# Patient Record
Sex: FEMALE | Race: WHITE | NOT HISPANIC OR LATINO | Employment: FULL TIME | URBAN - METROPOLITAN AREA
[De-identification: names, ages, dates, MRNs, and addresses within clinical notes are randomized per-mention and may not be internally consistent; named-entity substitution may affect disease eponyms.]

---

## 2023-06-04 PROBLEM — M54.16 LUMBAR RADICULITIS: Status: ACTIVE | Noted: 2023-06-04

## 2023-06-04 PROBLEM — L98.8 FACIAL RHYTIDS: Status: ACTIVE | Noted: 2023-06-04

## 2023-06-04 PROBLEM — M48.062 SPINAL STENOSIS OF LUMBAR REGION WITH NEUROGENIC CLAUDICATION: Status: ACTIVE | Noted: 2023-06-04

## 2023-06-04 PROBLEM — E04.1 THYROID NODULE: Status: ACTIVE | Noted: 2023-06-04

## 2023-06-04 PROBLEM — M25.569 ARTHRALGIA OF KNEE: Status: ACTIVE | Noted: 2023-06-04

## 2023-06-04 PROBLEM — M51.26 LUMBAR DISCOGENIC PAIN SYNDROME: Status: ACTIVE | Noted: 2023-06-04

## 2023-06-04 PROBLEM — H93.19 TINNITUS: Status: ACTIVE | Noted: 2023-06-04

## 2023-06-04 PROBLEM — F33.0 MILD EPISODE OF RECURRENT MAJOR DEPRESSIVE DISORDER (CMS-HCC): Status: ACTIVE | Noted: 2023-06-04

## 2023-06-04 PROBLEM — Z67.20 TYPE B BLOOD, RH POSITIVE: Status: ACTIVE | Noted: 2023-06-04

## 2023-06-04 PROBLEM — M25.50 ARTHRALGIA: Status: ACTIVE | Noted: 2023-06-04

## 2023-06-04 PROBLEM — F51.01 PRIMARY INSOMNIA: Status: ACTIVE | Noted: 2023-06-04

## 2023-06-04 PROBLEM — E78.00 PURE HYPERCHOLESTEROLEMIA: Status: ACTIVE | Noted: 2023-06-04

## 2023-06-04 PROBLEM — M47.816 LUMBAR SPONDYLOSIS: Status: ACTIVE | Noted: 2023-06-04

## 2023-06-04 PROBLEM — K64.5 EXTERNAL HEMORRHOID, THROMBOSED: Status: ACTIVE | Noted: 2023-06-04

## 2023-06-04 PROBLEM — D64.89 OTHER SPECIFIED ANEMIAS: Status: ACTIVE | Noted: 2023-06-04

## 2023-06-04 PROBLEM — K80.20 GALLSTONES: Status: ACTIVE | Noted: 2023-06-04

## 2023-06-04 PROBLEM — E55.9 VITAMIN D DEFICIENCY: Status: ACTIVE | Noted: 2023-06-04

## 2023-06-04 PROBLEM — F41.9 ANXIETY: Status: ACTIVE | Noted: 2023-06-04

## 2023-08-01 DIAGNOSIS — M25.512 PAIN IN LEFT SHOULDER: ICD-10-CM

## 2023-08-01 RX ORDER — MELOXICAM 15 MG/1
15 TABLET ORAL
Qty: 60 TABLET | Refills: 0 | Status: SHIPPED | OUTPATIENT
Start: 2023-08-01 | End: 2023-09-01 | Stop reason: SDUPTHER

## 2023-08-02 LAB
ALANINE AMINOTRANSFERASE (SGPT) (U/L) IN SER/PLAS: 17 U/L (ref 7–45)
ALBUMIN (G/DL) IN SER/PLAS: 4.4 G/DL (ref 3.4–5)
ALKALINE PHOSPHATASE (U/L) IN SER/PLAS: 80 U/L (ref 33–110)
ANION GAP IN SER/PLAS: 12 MMOL/L (ref 10–20)
ASPARTATE AMINOTRANSFERASE (SGOT) (U/L) IN SER/PLAS: 26 U/L (ref 9–39)
BASOPHILS (10*3/UL) IN BLOOD BY AUTOMATED COUNT: 0.09 X10E9/L (ref 0–0.1)
BASOPHILS/100 LEUKOCYTES IN BLOOD BY AUTOMATED COUNT: 1 % (ref 0–2)
BILIRUBIN TOTAL (MG/DL) IN SER/PLAS: 0.5 MG/DL (ref 0–1.2)
CALCIUM (MG/DL) IN SER/PLAS: 9.4 MG/DL (ref 8.6–10.3)
CARBON DIOXIDE, TOTAL (MMOL/L) IN SER/PLAS: 29 MMOL/L (ref 21–32)
CHLORIDE (MMOL/L) IN SER/PLAS: 102 MMOL/L (ref 98–107)
CHORIOGONADOTROPIN (MIU/ML) IN SER/PLAS: <2 MIU/ML
CREATININE (MG/DL) IN SER/PLAS: 1 MG/DL (ref 0.5–1.05)
EOSINOPHILS (10*3/UL) IN BLOOD BY AUTOMATED COUNT: 0.3 X10E9/L (ref 0–0.7)
EOSINOPHILS/100 LEUKOCYTES IN BLOOD BY AUTOMATED COUNT: 3.4 % (ref 0–6)
ERYTHROCYTE DISTRIBUTION WIDTH (RATIO) BY AUTOMATED COUNT: 11.9 % (ref 11.5–14.5)
ERYTHROCYTE MEAN CORPUSCULAR HEMOGLOBIN CONCENTRATION (G/DL) BY AUTOMATED: 32.8 G/DL (ref 32–36)
ERYTHROCYTE MEAN CORPUSCULAR VOLUME (FL) BY AUTOMATED COUNT: 98 FL (ref 80–100)
ERYTHROCYTES (10*6/UL) IN BLOOD BY AUTOMATED COUNT: 4.31 X10E12/L (ref 4–5.2)
GFR FEMALE: 69 ML/MIN/1.73M2
GLUCOSE (MG/DL) IN SER/PLAS: 81 MG/DL (ref 74–99)
HEMATOCRIT (%) IN BLOOD BY AUTOMATED COUNT: 42.4 % (ref 36–46)
HEMOGLOBIN (G/DL) IN BLOOD: 13.9 G/DL (ref 12–16)
IMMATURE GRANULOCYTES/100 LEUKOCYTES IN BLOOD BY AUTOMATED COUNT: 0.3 % (ref 0–0.9)
LEUKOCYTES (10*3/UL) IN BLOOD BY AUTOMATED COUNT: 8.8 X10E9/L (ref 4.4–11.3)
LYMPHOCYTES (10*3/UL) IN BLOOD BY AUTOMATED COUNT: 2.78 X10E9/L (ref 1.2–4.8)
LYMPHOCYTES/100 LEUKOCYTES IN BLOOD BY AUTOMATED COUNT: 31.5 % (ref 13–44)
MONOCYTES (10*3/UL) IN BLOOD BY AUTOMATED COUNT: 0.71 X10E9/L (ref 0.1–1)
MONOCYTES/100 LEUKOCYTES IN BLOOD BY AUTOMATED COUNT: 8 % (ref 2–10)
NEUTROPHILS (10*3/UL) IN BLOOD BY AUTOMATED COUNT: 4.91 X10E9/L (ref 1.2–7.7)
NEUTROPHILS/100 LEUKOCYTES IN BLOOD BY AUTOMATED COUNT: 55.8 % (ref 40–80)
PLATELETS (10*3/UL) IN BLOOD AUTOMATED COUNT: 296 X10E9/L (ref 150–450)
POTASSIUM (MMOL/L) IN SER/PLAS: 4.1 MMOL/L (ref 3.5–5.3)
PROTEIN TOTAL: 6.8 G/DL (ref 6.4–8.2)
SODIUM (MMOL/L) IN SER/PLAS: 139 MMOL/L (ref 136–145)
UREA NITROGEN (MG/DL) IN SER/PLAS: 19 MG/DL (ref 6–23)

## 2023-08-08 ENCOUNTER — TELEPHONE (OUTPATIENT)
Dept: PRIMARY CARE | Facility: CLINIC | Age: 48
End: 2023-08-08
Payer: COMMERCIAL

## 2023-08-08 DIAGNOSIS — R39.9 UTI SYMPTOMS: Primary | ICD-10-CM

## 2023-08-08 RX ORDER — SULFAMETHOXAZOLE AND TRIMETHOPRIM 800; 160 MG/1; MG/1
1 TABLET ORAL 2 TIMES DAILY
Qty: 14 TABLET | Refills: 0 | Status: SHIPPED | OUTPATIENT
Start: 2023-08-08 | End: 2023-08-15

## 2023-08-08 NOTE — TELEPHONE ENCOUNTER
email to me:  Hello there.  I am so sorry to be using this avenue, but I am a little desperate here.  I will be honest first by saying that I have not tried to call for an appointment yet.  I am 100% certain I have a UTI.  Most times in the past I have been able to get rid of them with just drinking lots of fluids and the azo pills.  I am now on day 3 and it's not getting better.  I would try and come in but I work tomorrow and I am having shoulder surgery on Thursday and really wanted to try and get this taken care of before hand.  Would it be too much to ask to help?    Plan: bactrim sent in.  Pt should inform surgeon of her uti.  Fluids  Azo  Call if sx worsen or change, especially fever, chills, worsening back or abdominal pain.

## 2023-08-10 ENCOUNTER — HOSPITAL ENCOUNTER (OUTPATIENT)
Dept: DATA CONVERSION | Facility: HOSPITAL | Age: 48
End: 2023-08-10
Attending: ORTHOPAEDIC SURGERY | Admitting: ORTHOPAEDIC SURGERY
Payer: COMMERCIAL

## 2023-08-10 DIAGNOSIS — M75.21 BICIPITAL TENDINITIS, RIGHT SHOULDER: ICD-10-CM

## 2023-08-10 DIAGNOSIS — M75.41 IMPINGEMENT SYNDROME OF RIGHT SHOULDER: ICD-10-CM

## 2023-08-10 DIAGNOSIS — S43.431A SUPERIOR GLENOID LABRUM LESION OF RIGHT SHOULDER, INITIAL ENCOUNTER: ICD-10-CM

## 2023-08-10 DIAGNOSIS — S46.011D STRAIN OF MUSCLE(S) AND TENDON(S) OF THE ROTATOR CUFF OF RIGHT SHOULDER, SUBSEQUENT ENCOUNTER: ICD-10-CM

## 2023-08-10 DIAGNOSIS — M75.101 UNSPECIFIED ROTATOR CUFF TEAR OR RUPTURE OF RIGHT SHOULDER, NOT SPECIFIED AS TRAUMATIC: ICD-10-CM

## 2023-08-31 ASSESSMENT — ENCOUNTER SYMPTOMS
CONSTITUTIONAL NEGATIVE: 1
SHORTNESS OF BREATH: 0
WHEEZING: 0

## 2023-08-31 NOTE — PROGRESS NOTES
Subjective   Patient ID: Ashtyn Yoo is a 47 y.o. female who presents for No chief complaint on file..  Last physical: 12/8/22  Last mammo 6/2022; does pt have an order from her ob gyn for next mammo? No  Last pap 4/25/18 cally Oden; due  Last colon 11/2021; due 11/2028  Does pt want flu shot? Yes  Did pt see rheumatologist? No    Is pt fasting? No  Phq9=5 , gad7=3    HPI  Spine surgery 1/2023  Rotator cuff surgery recently; in rehab    Refill meloxicam    Last labs-8/2023 cbc nl, cmp nl; 12/2022 tsh nl, ldl 146  Due for labs- lipid    Exercise-walking or riding bike and lower body exercises  Breakfast-fast or if up at 4a good breakfast; water, monster 1 a day  Lunch-meal delivery and fits in macros, water  Dinner-meal delivery at times, water  Snacks-protein shakes, fruit  Etoh-socially  Takes fiber supplement-helps with hemorrhoid issues    Was on wellbutrin 450mg a day and hydroxyzine prn  Weaned off wellbutrin but restarted it at 300mg and wants a refill of the 300mg    Cholesterol   Date Value Ref Range Status   12/07/2022 215 (H) 0 - 199 mg/dL Final     Comment:     .      AGE      DESIRABLE   BORDERLINE HIGH   HIGH     0-19 Y     0 - 169       170 - 199     >/= 200    20-24 Y     0 - 189       190 - 224     >/= 225         >24 Y     0 - 199       200 - 239     >/= 240   **All ranges are based on fasting samples. Specific   therapeutic targets will vary based on patient-specific   cardiac risk.  .   Pediatric guidelines reference:Pediatrics 2011, 128(S5).   Adult guidelines reference: NCEP ATPIII Guidelines,     HELLEN 2001, 258:2486-97  .   Venipuncture immediately after or during the    administration of Metamizole may lead to falsely   low results. Testing should be performed immediately   prior to Metamizole dosing.     10/05/2021 163 0 - 199 mg/dL Final     Comment:     .      AGE      DESIRABLE   BORDERLINE HIGH   HIGH     0-19 Y     0 - 169       170 - 199     >/= 200    20-24 Y     0 - 189       190 -  224     >/= 225         >24 Y     0 - 199       200 - 239     >/= 240   **All ranges are based on fasting samples. Specific   therapeutic targets will vary based on patient-specific   cardiac risk.  .   Pediatric guidelines reference:Pediatrics 2011, 128(S5).   Adult guidelines reference: NCEP ATPIII Guidelines,     HELLEN 2001, 258:2486-97  .   Venipuncture immediately after or during the    administration of Metamizole may lead to falsely   low results. Testing should be performed immediately   prior to Metamizole dosing.     09/27/2019 213 (H) 0 - 199 mg/dL Final     Comment:     .      AGE      DESIRABLE   BORDERLINE HIGH   HIGH     0-19 Y     0 - 169       170 - 199     >/= 200    20-24 Y     0 - 189       190 - 224     >/= 225         >24 Y     0 - 199       200 - 239     >/= 240   **All ranges are based on fasting samples. Specific   therapeutic targets will vary based on patient-specific   cardiac risk.  .   Pediatric guidelines reference:Pediatrics 2011, 128(S5).   Adult guidelines reference: NCEP ATPIII Guidelines,     HELLEN 2001, 258:2486-97  .   Venipuncture immediately after or during the    administration of Metamizole may lead to falsely   low results. Testing should be performed immediately   prior to Metamizole dosing.       Triglycerides   Date Value Ref Range Status   12/07/2022 122 0 - 149 mg/dL Final     Comment:     .      AGE      DESIRABLE   BORDERLINE HIGH   HIGH     VERY HIGH   0 D-90 D    19 - 174         ----         ----        ----  91 D- 9 Y     0 -  74        75 -  99     >/= 100      ----    10-19 Y     0 -  89        90 - 129     >/= 130      ----    20-24 Y     0 - 114       115 - 149     >/= 150      ----         >24 Y     0 - 149       150 - 199    200- 499    >/= 500  .   Venipuncture immediately after or during the    administration of Metamizole may lead to falsely   low results. Testing should be performed immediately   prior to Metamizole dosing.     10/05/2021 63 0 - 149 mg/dL  Final     Comment:     .      AGE      DESIRABLE   BORDERLINE HIGH   HIGH     VERY HIGH   0 D-90 D    19 - 174         ----         ----        ----  91 D- 9 Y     0 -  74        75 -  99     >/= 100      ----    10-19 Y     0 -  89        90 - 129     >/= 130      ----    20-24 Y     0 - 114       115 - 149     >/= 150      ----         >24 Y     0 - 149       150 - 199    200- 499    >/= 500  .   Venipuncture immediately after or during the    administration of Metamizole may lead to falsely   low results. Testing should be performed immediately   prior to Metamizole dosing.     09/27/2019 81 0 - 149 mg/dL Final     Comment:     .      AGE      DESIRABLE   BORDERLINE HIGH   HIGH     VERY HIGH   0 D-90 D    19 - 174         ----         ----        ----  91 D- 9 Y     0 -  74        75 -  99     >/= 100      ----    10-19 Y     0 -  89        90 - 129     >/= 130      ----    20-24 Y     0 - 114       115 - 149     >/= 150      ----         >24 Y     0 - 149       150 - 199    200- 499    >/= 500  .   Venipuncture immediately after or during the    administration of Metamizole may lead to falsely   low results. Testing should be performed immediately   prior to Metamizole dosing.       HDL   Date Value Ref Range Status   12/07/2022 44.2 mg/dL Final     Comment:     .      AGE      VERY LOW   LOW     NORMAL    HIGH       0-19 Y       < 35   < 40     40-45     ----    20-24 Y       ----   < 40       >45     ----      >24 Y       ----   < 40     40-60      >60  .     10/05/2021 72.0 mg/dL Final     Comment:     .      AGE      VERY LOW   LOW     NORMAL    HIGH       0-19 Y       < 35   < 40     40-45     ----    20-24 Y       ----   < 40       >45     ----      >24 Y       ----   < 40     40-60      >60  .     09/27/2019 94.1 mg/dL Final     Comment:     .      AGE      VERY LOW   LOW     NORMAL    HIGH       0-19 Y       < 35   < 40     40-45     ----    20-24 Y       ----   < 40       >45     ----      >24 Y        "----   < 40     40-60      >60  .       No results found for: \"LDL\"  TSH   Date Value Ref Range Status   12/07/2022 1.17 0.44 - 3.98 mIU/L Final     Comment:      TSH testing is performed using different testing    methodology at [facility] than at other    [facility] hospitals. Direct result comparisons should    only be made within the same method.       No results found for: \"A1C\"  No components found for: \"POCA1C\"  No results found for: \"ALBUR\"  No components found for: \"POCALBUR\"        Immunization History   Administered Date(s) Administered    Moderna SARS-CoV-2 Vaccination 12/30/2020, 01/26/2021, 11/15/2021    Tdap vaccine, age 10 years and older (BOOSTRIX) 05/27/2022        No care team member to display     Review of Systems   Constitutional: Negative.    Respiratory:  Negative for shortness of breath and wheezing.    Cardiovascular:  Negative for chest pain.     Objective   There were no vitals taken for this visit.   BP Readings from Last 3 Encounters:   12/08/22 121/76   11/03/22 (!) 149/95   06/09/22 118/78     Wt Readings from Last 3 Encounters:   12/27/22 70.3 kg (155 lb)   12/08/22 70.8 kg (156 lb)   11/14/22 68 kg (150 lb)       The 10-year ASCVD risk score (Ruslan KIM, et al., 2019) is: 1.4%    Values used to calculate the score:      Age: 47 years      Sex: Female      Is Non- : No      Diabetic: No      Tobacco smoker: No      Systolic Blood Pressure: 121 mmHg      Is BP treated: No      HDL Cholesterol: 44.2 mg/dL      Total Cholesterol: 215 mg/dL     Physical Exam  Constitutional:       General: She is not in acute distress.     Appearance: Normal appearance.   Neurological:      Mental Status: She is alert.     Assessment/Plan   Diagnoses and all orders for this visit:  Pure hypercholesterolemia  -     Lipid Panel; Future  Anxiety  -     buPROPion XL (Wellbutrin XL) 300 mg 24 hr tablet; Take 1 tablet (300 mg) by mouth once daily. Do not crush, chew, or " split.  Mild episode of recurrent major depressive disorder (CMS/HCC)  -     buPROPion XL (Wellbutrin XL) 300 mg 24 hr tablet; Take 1 tablet (300 mg) by mouth once daily. Do not crush, chew, or split.  Encounter for screening mammogram for malignant neoplasm of breast  -     BI mammo bilateral screening tomosynthesis; Future  Pain in left shoulder  -     meloxicam (Mobic) 15 mg tablet; Take 1 tablet (15 mg) by mouth once daily. Take with food.  Arthralgia, unspecified joint  -     Referral to Rheumatology; Future  Other orders  -     Flu vaccine (IIV4) age 6 months and greater, preservative free  -     Follow Up In Primary Care - Health Maintenance; Future

## 2023-09-01 ENCOUNTER — OFFICE VISIT (OUTPATIENT)
Dept: PRIMARY CARE | Facility: CLINIC | Age: 48
End: 2023-09-01
Payer: COMMERCIAL

## 2023-09-01 VITALS
SYSTOLIC BLOOD PRESSURE: 129 MMHG | BODY MASS INDEX: 26.12 KG/M2 | WEIGHT: 153 LBS | DIASTOLIC BLOOD PRESSURE: 85 MMHG | TEMPERATURE: 97.7 F | HEIGHT: 64 IN | HEART RATE: 78 BPM

## 2023-09-01 DIAGNOSIS — E78.00 PURE HYPERCHOLESTEROLEMIA: Primary | ICD-10-CM

## 2023-09-01 DIAGNOSIS — F41.9 ANXIETY: ICD-10-CM

## 2023-09-01 DIAGNOSIS — Z12.31 ENCOUNTER FOR SCREENING MAMMOGRAM FOR MALIGNANT NEOPLASM OF BREAST: ICD-10-CM

## 2023-09-01 DIAGNOSIS — M25.512 PAIN IN LEFT SHOULDER: ICD-10-CM

## 2023-09-01 DIAGNOSIS — F33.0 MILD EPISODE OF RECURRENT MAJOR DEPRESSIVE DISORDER (CMS-HCC): ICD-10-CM

## 2023-09-01 DIAGNOSIS — M25.50 ARTHRALGIA, UNSPECIFIED JOINT: ICD-10-CM

## 2023-09-01 PROCEDURE — 99214 OFFICE O/P EST MOD 30 MIN: CPT | Performed by: NURSE PRACTITIONER

## 2023-09-01 PROCEDURE — 90686 IIV4 VACC NO PRSV 0.5 ML IM: CPT | Performed by: NURSE PRACTITIONER

## 2023-09-01 PROCEDURE — 1036F TOBACCO NON-USER: CPT | Performed by: NURSE PRACTITIONER

## 2023-09-01 PROCEDURE — 90471 IMMUNIZATION ADMIN: CPT | Performed by: NURSE PRACTITIONER

## 2023-09-01 RX ORDER — BUPROPION HYDROCHLORIDE 300 MG/1
300 TABLET ORAL DAILY
Qty: 90 TABLET | Refills: 4 | Status: SHIPPED | OUTPATIENT
Start: 2023-09-01 | End: 2024-05-14 | Stop reason: SDUPTHER

## 2023-09-01 RX ORDER — MELOXICAM 15 MG/1
15 TABLET ORAL DAILY
Qty: 30 TABLET | Refills: 5 | Status: SHIPPED | OUTPATIENT
Start: 2023-09-01 | End: 2023-12-26

## 2023-09-01 ASSESSMENT — PATIENT HEALTH QUESTIONNAIRE - PHQ9
10. IF YOU CHECKED OFF ANY PROBLEMS, HOW DIFFICULT HAVE THESE PROBLEMS MADE IT FOR YOU TO DO YOUR WORK, TAKE CARE OF THINGS AT HOME, OR GET ALONG WITH OTHER PEOPLE: NOT DIFFICULT AT ALL
1. LITTLE INTEREST OR PLEASURE IN DOING THINGS: NOT AT ALL
7. TROUBLE CONCENTRATING ON THINGS, SUCH AS READING THE NEWSPAPER OR WATCHING TELEVISION: NOT AT ALL
6. FEELING BAD ABOUT YOURSELF - OR THAT YOU ARE A FAILURE OR HAVE LET YOURSELF OR YOUR FAMILY DOWN: SEVERAL DAYS
8. MOVING OR SPEAKING SO SLOWLY THAT OTHER PEOPLE COULD HAVE NOTICED. OR THE OPPOSITE, BEING SO FIGETY OR RESTLESS THAT YOU HAVE BEEN MOVING AROUND A LOT MORE THAN USUAL: NOT AT ALL
SUM OF ALL RESPONSES TO PHQ9 QUESTIONS 1 AND 2: 1
SUM OF ALL RESPONSES TO PHQ QUESTIONS 1-9: 5
2. FEELING DOWN, DEPRESSED OR HOPELESS: SEVERAL DAYS
9. THOUGHTS THAT YOU WOULD BE BETTER OFF DEAD, OR OF HURTING YOURSELF: NOT AT ALL
5. POOR APPETITE OR OVEREATING: NOT AT ALL
4. FEELING TIRED OR HAVING LITTLE ENERGY: NOT AT ALL
3. TROUBLE FALLING OR STAYING ASLEEP OR SLEEPING TOO MUCH: NEARLY EVERY DAY

## 2023-09-01 ASSESSMENT — ANXIETY QUESTIONNAIRES
GAD7 TOTAL SCORE: 3
IF YOU CHECKED OFF ANY PROBLEMS ON THIS QUESTIONNAIRE, HOW DIFFICULT HAVE THESE PROBLEMS MADE IT FOR YOU TO DO YOUR WORK, TAKE CARE OF THINGS AT HOME, OR GET ALONG WITH OTHER PEOPLE: NOT DIFFICULT AT ALL
7. FEELING AFRAID AS IF SOMETHING AWFUL MIGHT HAPPEN: NOT AT ALL
6. BECOMING EASILY ANNOYED OR IRRITABLE: NOT AT ALL
2. NOT BEING ABLE TO STOP OR CONTROL WORRYING: SEVERAL DAYS
5. BEING SO RESTLESS THAT IT IS HARD TO SIT STILL: NOT AT ALL
1. FEELING NERVOUS, ANXIOUS, OR ON EDGE: SEVERAL DAYS
4. TROUBLE RELAXING: NOT AT ALL
3. WORRYING TOO MUCH ABOUT DIFFERENT THINGS: SEVERAL DAYS

## 2023-09-01 NOTE — PATIENT INSTRUCTIONS
Refill meloxicam    Continue wellbutrin 300mg 1 a day-refill done    See rheum   Set up mammogram    Flu shot today    You can use the lab in our building when fasting. The hrs are: Monday-Friday, 7 a.m. - 5 p.m., Saturday 8 a.m. - 12 noon.   No appt needed, BUT YOU DO NEED THE PAPER ORDER.    Fasting is no food, drink, gum or mints other than water for 8-12 hrs.   Results will be back in 2-3 business days for most labs. It is always recommended for any orders (labs, xrays, ultrasounds,MRI, ct scan, procedures etc) to check with your insurance provider for expected costs or expenses to you.     Goal LDL <100 (146)  Goal trigs <150  Goal HDL >50  Exercise-cardio 4-5d/wk 30min each day  Diet-Breakfast-toast (my favorite Lorna Doss Delightful multi-grain and Timothy's Killer Bread thin-sliced Good Seed)/bagel/English muffin-whole wheat flour as a 1st ingredient or cereal/oatmeal/granola bar-fiber 4g or more; protein like eggs or peanut butter is Ok  Lunch-protein, veg 1c  Dinner-protein, veg 1c; if having potatoes, rice, noodles, or pasta-->fist sized; could try brown rice or whole wheat pasta or quinoa  Fruit 2 a day  Dairy 2 a day-milk, almond milk, soy milk, yogurt, cottage cheese, yogurt  Snacks-Protein-hard boiled egg, nuts (walnuts/almonds/pecans/pistachios 1/4c), hummus, beef/deer jerky; vegetable, fruit, dairy-milk(1%, skim, almond, soy)/cheese (not a lot of cheddar)/yogurt (Greek is best-my favorite Dannon Fruit on the Bottom Greek)/cottage cheese 2%; triscuits, popcorn have a lot of fiber; serving size  Water  Limit alcohol to 2 drinks per day (1 drink=12oz beer or 5oz wine or 1 1/2oz liquor)  appt in  6  months; be fasting      I will communicate with you via [employer] regarding messages and results. If you need help with this, you can call the support line at [phone].    IT WAS A PLEASURE TO SEE YOU TODAY. THANK YOU FOR CHOOSING US FOR YOUR HEALTHCARE NEEDS.

## 2023-09-11 ENCOUNTER — TELEPHONE (OUTPATIENT)
Dept: PRIMARY CARE | Facility: CLINIC | Age: 48
End: 2023-09-11

## 2023-09-11 NOTE — TELEPHONE ENCOUNTER
Pls inform pt:  I received the last pap result from dr leigh.   It was 4/25/18  Pt is due for next pap.

## 2023-09-29 VITALS — WEIGHT: 147.05 LBS | HEIGHT: 60 IN | BODY MASS INDEX: 28.87 KG/M2

## 2023-09-30 NOTE — H&P
History & Physical Reviewed:   Pregnant/Lactating:  ·  Are You Pregnant no   ·  Are You Currently Breastfeeding no     I have reviewed the History and Physical dated:  21-Jul-2023   History and Physical reviewed and relevant findings noted. Patient examined to review pertinent physical  findings.: No significant changes   Home Medications Reviewed: no changes noted   Allergies Reviewed: no changes noted       ERAS (Enhanced Recovery After Surgery):  ·  ERAS Patient: no     Consent:   COVID-19 Consent:  ·  COVID-19 Risk Consent Surgeon has reviewed key risks related to the risk of shira COVID-19 and if they contract COVID-19 what the risks are.     Attestation:   Note Completion:  I am a:  Resident/Fellow   Attending Attestation I saw and evaluated the patient.  I personally obtained the key and critical portions of the history and physical exam or was physically present for key and  critical portions performed by the resident/fellow. I reviewed the resident/fellow?s documentation and discussed the patient with the resident/fellow.  I agree with the resident/fellow?s medical decision making as documented in the note.     I personally evaluated the patient on 10-Aug-2023         Electronic Signatures:  Ck Devries (DO (Resident))  (Signed 10-Aug-2023 07:17)   Authored: History & Physical Reviewed, ERAS, Consent,  Note Completion  Quang Maria)  (Signed 10-Aug-2023 14:20)   Authored: Note Completion   Co-Signer: History & Physical Reviewed, ERAS, Consent, Note Completion      Last Updated: 10-Aug-2023 14:20 by Quang Maria)

## 2023-10-01 NOTE — OP NOTE
PROCEDURE DETAILS    Preoperative Diagnosis:  Right shoulder rotator cuff tear, subacromial impingment, biceps tendonitits  Postoperative Diagnosis:  Right shoulder rotator cuff tear, subacromial impingment, biceps tendonitits  Surgeon: Crow  Resident/Fellow/Other Assistant: Jaycob    Procedure:  right shoulder arthroscopy, rotator cuff repair, biceps tenodesis, subacromial decompression, limited debridement  Anesthesia: general with regional block  Estimated Blood Loss: 10cc  Findings: as above  Specimens(s) Collected: no,     Complications: none  Patient Returned To/Condition: stable to PACU                                Attestation:   Note Completion:  Attending Attestation I was present for the entire procedure    I am a: Resident/Fellow         Electronic Signatures:  Ck Devries (DO (Resident))  (Signed 10-Aug-2023 14:41)   Authored: Post-Operative Note, Chart Review, Note Completion  Quang Maria)  (Signed 28-Aug-2023 19:53)   Authored: Note Completion   Co-Signer: Post-Operative Note, Chart Review, Note Completion      Last Updated: 28-Aug-2023 19:53 by Quang Maria)

## 2023-10-04 ENCOUNTER — APPOINTMENT (OUTPATIENT)
Dept: PHYSICAL THERAPY | Facility: CLINIC | Age: 48
End: 2023-10-04
Payer: COMMERCIAL

## 2023-10-04 ENCOUNTER — TREATMENT (OUTPATIENT)
Dept: PHYSICAL THERAPY | Facility: CLINIC | Age: 48
End: 2023-10-04
Payer: COMMERCIAL

## 2023-10-04 DIAGNOSIS — S46.011D TRAUMATIC COMPLETE TEAR OF RIGHT ROTATOR CUFF, SUBSEQUENT ENCOUNTER: Primary | ICD-10-CM

## 2023-10-04 PROBLEM — S46.011A TRAUMATIC COMPLETE TEAR OF RIGHT ROTATOR CUFF: Status: ACTIVE | Noted: 2023-10-04

## 2023-10-04 PROBLEM — M75.101 RIGHT ROTATOR CUFF TEAR: Status: ACTIVE | Noted: 2023-10-04

## 2023-10-04 PROBLEM — D22.5 MELANOCYTIC NEVI OF TRUNK: Status: ACTIVE | Noted: 2022-11-30

## 2023-10-04 PROBLEM — M17.9 ARTHRITIS OF KNEE, DEGENERATIVE: Status: ACTIVE | Noted: 2023-10-04

## 2023-10-04 PROBLEM — D48.5 NEOPLASM OF UNCERTAIN BEHAVIOR OF SKIN: Status: ACTIVE | Noted: 2023-10-04

## 2023-10-04 PROBLEM — D22.60 MELANOCYTIC NEVI OF UNSPECIFIED UPPER LIMB, INCLUDING SHOULDER: Status: ACTIVE | Noted: 2022-11-30

## 2023-10-04 PROBLEM — D22.70 MELANOCYTIC NEVI OF LOWER LIMB, INCLUDING HIP: Status: ACTIVE | Noted: 2022-11-30

## 2023-10-04 PROBLEM — F41.1 ANXIETY, GENERALIZED: Status: ACTIVE | Noted: 2023-01-24

## 2023-10-04 PROBLEM — Z86.03 PERSONAL HISTORY OF NEOPLASM OF UNCERTAIN BEHAVIOR: Status: ACTIVE | Noted: 2023-01-24

## 2023-10-04 PROBLEM — N90.89 VULVAR LESION: Status: ACTIVE | Noted: 2022-03-22

## 2023-10-04 PROBLEM — L81.4 OTHER MELANIN HYPERPIGMENTATION: Status: ACTIVE | Noted: 2022-11-30

## 2023-10-04 PROBLEM — R79.9 ELEVATED BUN: Status: ACTIVE | Noted: 2023-10-04

## 2023-10-04 PROBLEM — D18.01 HEMANGIOMA OF SKIN AND SUBCUTANEOUS TISSUE: Status: ACTIVE | Noted: 2022-11-30

## 2023-10-04 PROBLEM — R79.89 ELEVATED SERUM CREATININE: Status: ACTIVE | Noted: 2023-10-04

## 2023-10-04 PROBLEM — R93.89 ABNORMAL X-RAY: Status: ACTIVE | Noted: 2023-10-04

## 2023-10-04 PROBLEM — L82.1 OTHER SEBORRHEIC KERATOSIS: Status: ACTIVE | Noted: 2022-11-30

## 2023-10-04 PROBLEM — R69 TAKING MEDICATION FOR CHRONIC DISEASE: Status: ACTIVE | Noted: 2023-10-04

## 2023-10-04 PROCEDURE — 97140 MANUAL THERAPY 1/> REGIONS: CPT | Mod: GP,CQ

## 2023-10-04 PROCEDURE — 97110 THERAPEUTIC EXERCISES: CPT | Mod: GP,CQ

## 2023-10-04 RX ORDER — ASPIRIN 81 MG/1
TABLET ORAL
COMMUNITY
Start: 2023-08-10 | End: 2024-05-13 | Stop reason: ALTCHOICE

## 2023-10-04 RX ORDER — CYST/ALA/Q10/PHOS.SER/DHA/BROC 100-20-50
POWDER (GRAM) ORAL
COMMUNITY
End: 2024-05-13 | Stop reason: ALTCHOICE

## 2023-10-04 RX ORDER — OXYCODONE AND ACETAMINOPHEN 5; 325 MG/1; MG/1
TABLET ORAL
COMMUNITY
Start: 2023-08-15 | End: 2024-05-14 | Stop reason: ALTCHOICE

## 2023-10-04 RX ORDER — GUAIFENESIN 1200 MG
TABLET, EXTENDED RELEASE 12 HR ORAL
COMMUNITY
Start: 2023-01-24

## 2023-10-04 RX ORDER — MELATONIN 3 MG
TABLET ORAL
COMMUNITY
End: 2024-05-14 | Stop reason: ALTCHOICE

## 2023-10-04 RX ORDER — CITALOPRAM 10 MG/1
1 TABLET ORAL DAILY
COMMUNITY
Start: 2022-06-29 | End: 2024-05-14 | Stop reason: ALTCHOICE

## 2023-10-04 RX ORDER — OXYCODONE HYDROCHLORIDE 5 MG/1
1 TABLET ORAL EVERY 6 HOURS
COMMUNITY
Start: 2023-02-01 | End: 2024-05-14 | Stop reason: ALTCHOICE

## 2023-10-04 RX ORDER — CREATININE 100 %
POWDER (GRAM) MISCELLANEOUS
COMMUNITY
Start: 2022-06-09 | End: 2024-05-13 | Stop reason: ALTCHOICE

## 2023-10-04 RX ORDER — BUPROPION HYDROCHLORIDE 150 MG/1
1 TABLET ORAL DAILY
COMMUNITY
Start: 2022-05-27 | End: 2024-05-14 | Stop reason: ALTCHOICE

## 2023-10-04 RX ORDER — TERCONAZOLE 8 MG/G
CREAM VAGINAL
COMMUNITY
Start: 2021-10-01 | End: 2024-05-13 | Stop reason: ALTCHOICE

## 2023-10-04 RX ORDER — POLYETHYLENE GLYCOL 3350 17 G/17G
POWDER, FOR SOLUTION ORAL
COMMUNITY
Start: 2023-01-24

## 2023-10-04 RX ORDER — MAGNESIUM HYDROXIDE 400 MG/5ML
SUSPENSION, ORAL (FINAL DOSE FORM) ORAL
COMMUNITY
Start: 2023-01-17

## 2023-10-04 RX ORDER — CHLORHEXIDINE GLUCONATE ORAL RINSE 1.2 MG/ML
SOLUTION DENTAL
COMMUNITY
Start: 2023-01-17

## 2023-10-04 ASSESSMENT — PAIN SCALES - GENERAL: PAINLEVEL_OUTOF10: 4

## 2023-10-04 ASSESSMENT — PAIN - FUNCTIONAL ASSESSMENT: PAIN_FUNCTIONAL_ASSESSMENT: 0-10

## 2023-10-04 NOTE — PROGRESS NOTES
Physical Therapy    Physical Therapy Treatment    Patient Name: Ashtyn Yoo  MRN: [id number]  Today's Date: 10/4/2023  Time Calculation  Start Time: 0745  Stop Time: 0825  Time Calculation (min): 40 min  Adult Risk Screening  There are no spiritual/cultural practices/values/needs that are important to know   Initial Fall Risk Screening:   ASHTYN has not fallen in the last 6 months. ASHTYN does not have a fear of falling. She does not need assistance with sitting, standing or walking. Does not need assistance walking in her home. She does not need assistance in an unfamiliar setting. The patient is not using an assistive device.   Pain Scale: On a scale of 0 to 10, the patient rates the pain at 3-4.   Please identify location of pain: R shoulder: general, but worse anteriorly/sore over R supraspinatus tendon.   Pain Quality: aching and sharp.        Domestic Violence Screen: Does not feel threatened or abused physically, emotionally or sexually. Do you feel UNSAFE?   The patient feels safe in the home.   Depression/Suicide Screening:   During the past 2 weeks, the patient has not felt down, depressed or hopeless.    During the past 2 weeks, the patient has not felt little interest or pleasure in doing things.    She does not have a risk of suicide.        Insurance    Insurance reviewed   Visit number: 7   [employer]/30 visits PT/OT (none used)/ded met/90%          Assessment:   -function increasing as per subjective report  -performed exercises as per log: progressed isometrics and rows and issued all in writing , updating HEP  -did have some increased soreness post rx though declined modalities due to going to work though plans to continue modalities as needed at future appts.    Plan:   Continue with Phase II Bicep tendon and RTC repair protocol, initiate light bicep resistance as per protocol.  PT agrees with billing as in this note.    Current Problem  1. Traumatic complete tear of right rotator cuff,  "subsequent encounter            Subjective   General   Pt. Reports she is returning to work today on restricted duty. Has been compliant with HEP.Does have some soreness today.  Precautions  Precautions  Precautions Comment: Fall Risk: none   OA, s/p Lumbar fusion 1-28-23 due to cyst, R hip labral repair several years ago, restless leg syndrome see meds in chart.       Pain  Pain Assessment: 0-10  Pain Score: 4  Pain Location: Shoulder  Pain Orientation: Right    Objective     PROM supine  flexion: 0/172  ER: 0/68  IR: 0/76  Treatments:  Treatment Performed Today: see exercise flow sheet.   Therapeutic exercise (38840): timed minutes 35, units 2 . **= HEP NV= Next visit np= not performed nb= non-billable G= group HEP= discharged to [facility]  Per protocol  Access Code: [email or web address]  Ther Exer:   Nu-step: Lev 1 x 10' (AAROM R shoulder only, no bicep activation)  Pendulum all planes x 20 ea. **/np  Pulley flex/elev: x 20 ea.  Wall walk flex/elev with L UE A to avoid bicep activation: x 10 ea. /5\" **np  Towel wall sides for flex 5 sec 10x  Seated R pronation/supination: x 20/np  AROM R bicep curl: 2 x 10(start light resistance at 8 weeks post-op 10-5-23)    R shoulder isometric abd/IR/ER/add/ext x 10/5\" sub max **    Neuromuscular Re-education (54947): timed minutes 5, units 0 . Scap pinches: 10 x 2\"**  Chin tucks: 10/5\"**/HEP    Theraband scapular depression: Green/ 2 x 10**  Theraband high/mid rows: Green/ 2 x 10 ea. **    Manual Therapy (22701): timed minutes 10, units 1 . Manual: PROM all planes R shoulder     OP EDUCATION:     Access Code: [email or web address]  URL: [email or web address]  Date: 10/04/2023  Prepared by: Nikki    Exercises  - Seated Scapular Retraction  - 3-5 x daily - 7 x weekly - 1-2 sets - 10 reps - 5 second hold  - Flexion-Extension Shoulder Pendulum with Table Support (Mirrored)  - 2-3 x daily - 7 x weekly - 2-3 sets - 10 reps  - Circular Shoulder Pendulum with Table Support  - 1 x daily - 7 x " weekly - 2-3 sets - 10 reps  - Horizontal Shoulder Pendulum with Table Support  - 2-3 x daily - 7 x weekly - 2-3 sets - 10 reps  - Seated Cervical Retraction  - 3-5 x daily - 7 x weekly - 2-3 sets - 10 reps - 5 second hold  - Seated Upper Trapezius Stretch  - 1 x daily - 7 x weekly - 1 sets - 3 reps - 30 second hold  - Seated Levator Scapulae Stretch (Mirrored)  - 1 x daily - 7 x weekly - 1 sets - 3 reps - 30 second hold  - Seated Shoulder Flexion AAROM with Pulley Behind  - 1 x daily - 7 x weekly - 3 sets - 10 reps  - Standing Shoulder Scaption AAROM with Pulley Behind  - 1 x daily - 7 x weekly - 3 sets - 10 reps  - Standing Shoulder Flexion Wall Walk (Mirrored)  - 1 x daily - 7 x weekly - 2-3 sets - 10 reps - 5 second hold  - Standing Shoulder Scaption Wall Walk  - 1 x daily - 7 x weekly - 3 sets - 10 reps  - Bilateral Scapular Depression with Anchored Resistance  - 1 x daily - 7 x weekly - 3 sets - 10 reps  - Standing Isometric Shoulder Internal Rotation at Doorway  - 1 x daily - 7 x weekly - 3 sets - 10 reps - 5 hold  - Isometric Shoulder External Rotation at Wall  - 1 x daily - 7 x weekly - 3 sets - 10 reps - 5 hold  - Isometric Shoulder Abduction at Wall  - 1 x daily - 7 x weekly - 3 sets - 10 reps - 5 hold  - Isometric Shoulder Extension at Wall  - 1 x daily - 7 x weekly - 3 sets - 10 reps - 5 hold  - Isometric Shoulder Adduction  - 1 x daily - 7 x weekly - 3 sets - 10 reps - 5 hold  Goals:   Continue with POC as documented in legacy system.

## 2023-10-06 ENCOUNTER — TREATMENT (OUTPATIENT)
Dept: PHYSICAL THERAPY | Facility: CLINIC | Age: 48
End: 2023-10-06
Payer: COMMERCIAL

## 2023-10-06 DIAGNOSIS — S46.011D TRAUMATIC COMPLETE TEAR OF RIGHT ROTATOR CUFF, SUBSEQUENT ENCOUNTER: Primary | ICD-10-CM

## 2023-10-06 PROCEDURE — 97110 THERAPEUTIC EXERCISES: CPT | Mod: GP | Performed by: PHYSICAL THERAPIST

## 2023-10-06 PROCEDURE — 97140 MANUAL THERAPY 1/> REGIONS: CPT | Mod: GP | Performed by: PHYSICAL THERAPIST

## 2023-10-06 PROCEDURE — 97014 ELECTRIC STIMULATION THERAPY: CPT | Mod: GP | Performed by: PHYSICAL THERAPIST

## 2023-10-06 ASSESSMENT — PAIN - FUNCTIONAL ASSESSMENT: PAIN_FUNCTIONAL_ASSESSMENT: 0-10

## 2023-10-06 NOTE — PROGRESS NOTES
"Physical Therapy    Physical Therapy Treatment    Patient Name: Ashtyn Yoo  MRN: [id number]  Today's Date: 10/6/2023  Time Calculation  Start Time: 0700  Stop Time: 0800  Time Calculation (min): 60 min  Insurance reviewed   Visit number: 7   [employer]/30 visits PT/OT (none used)/ded met/90%       Assessment:  Patient tolerated treatment well, did well with progression this date.  Needs continued work on/skilled PT for: ROM and strength    Function: Patient is progressing well with:  RTW    Skilled care:  exercise/HEP progression, education, modalities, man.        Plan:   Continue to progress with functional strength, add light weight to Bicep curls.  Continue with poc as documented in the [employer] system.       Current Problem  1. Traumatic complete tear of right rotator cuff, subsequent encounter            Subjective   Patient reports:  that she has been working and just doing injections, she is unable to spike at IV bag or draw up high volume meds and is using L UE for mouse to document.      Have you fallen  since last visit:  no    What increases pain:  attempts to reach high overhead.   What decreases pain:  Meloxicam, has not needed to use ice    HEP compliance:  Yes     Precautions  Precautions  Precautions Comment: Fall Risk: none   OA, s/p Lumbar fusion 23 due to cyst, R hip labral repair several years ago, restless leg syndrome see meds in chart.  Vital Signs     Pain  Pain Assessment: 0-10  Pain Score:  (0-1/10 R ant/bicep aching/sharp at times)    Objective   ROM   R shoulder AROM flexion standin/90  PROM supine  Flexion:  0/170  Elevation:  0/180  ER:  0/71    Therapeutic Exercise  Therapeutic Exercise Performed: Yes (28')  Therapeutic Exercise Activity 1: Nu-step: Lev 3 x 10' (subjective taken)  Therapeutic Exercise Activity 2: Pulley flex/elev/IR: x 30 ea.  Therapeutic Exercise Activity 3: Cane AAROM ER:  20/\"**  Therapeutic Exercise Activity 4: Theraband R shoulder ext/add/IR:  " "Green/ 2 x 10 ea.  Therapeutic Exercise Activity 6: Bicep curl:  2 x 10 (Add light weight NV as able.)  Therapeutic Exercise Activity 7: B shoulder flexion:  2 x 10/elevation to 90 with scap stab:  1 x 6, 1 x 4    Manual Therapy  Manual Therapy Performed: Yes (10')  Manual Therapy Activity 1: PROM all planes R shoulder    Balance/Neuromuscular Re-Education  Balance/Neuromuscular Re-Education Activity Performed: Yes (2')  Balance/Neuromuscular Re-Education Activity 1: Scap pinches: 10 x 2\"**  Balance/Neuromuscular Re-Education Activity 2: Theraband lower trap squeeze:  trial Yellow NV as able.    Modalities  Modalities Used: Yes  Modality 1:  (IFC /CP x 15 min 80/-150Hz/40% intensity 10/supine with bolster/)              OP EDUCATION:   Access Code: [email or web address]         "

## 2023-10-09 ENCOUNTER — DOCUMENTATION (OUTPATIENT)
Dept: PHYSICAL THERAPY | Facility: CLINIC | Age: 48
End: 2023-10-09
Payer: COMMERCIAL

## 2023-10-09 NOTE — PROGRESS NOTES
Physical Therapy                 Therapy Communication Note    Patient Name: Ashtyn Yoo  MRN: [id number]  Today's Date: 10/9/2023     Discipline: Physical Therapy    Missed Visit Reason:  No Show, called pt. And LM re: NS and next appt.

## 2023-10-11 ENCOUNTER — APPOINTMENT (OUTPATIENT)
Dept: PHYSICAL THERAPY | Facility: CLINIC | Age: 48
End: 2023-10-11
Payer: COMMERCIAL

## 2023-10-11 ENCOUNTER — TREATMENT (OUTPATIENT)
Dept: PHYSICAL THERAPY | Facility: CLINIC | Age: 48
End: 2023-10-11
Payer: COMMERCIAL

## 2023-10-11 DIAGNOSIS — S46.011D TRAUMATIC COMPLETE TEAR OF RIGHT ROTATOR CUFF, SUBSEQUENT ENCOUNTER: Primary | ICD-10-CM

## 2023-10-11 PROCEDURE — 97110 THERAPEUTIC EXERCISES: CPT | Mod: GP | Performed by: PHYSICAL THERAPIST

## 2023-10-11 PROCEDURE — 97014 ELECTRIC STIMULATION THERAPY: CPT | Mod: GP | Performed by: PHYSICAL THERAPIST

## 2023-10-11 ASSESSMENT — PAIN - FUNCTIONAL ASSESSMENT: PAIN_FUNCTIONAL_ASSESSMENT: 0-10

## 2023-10-11 ASSESSMENT — PAIN SCALES - GENERAL: PAINLEVEL_OUTOF10: 3

## 2023-10-11 NOTE — PROGRESS NOTES
Physical Therapy    Physical Therapy Treatment    Patient Name: Ashtyn Yoo  MRN: [id number]  Today's Date: 10/11/2023  Time Calculation  Start Time: 0745  Stop Time: 0840  Time Calculation (min): 55 min  Insurance reviewed   Visit number: 8   [employer]/30 visits PT/OT (none used)/ded met/90%       Assessment:  Patient tolerated treatment well, did well with progression this date.  Needs continued work on/skilled PT for: ROM and strength    Function: Patient is progressing well with:  RTW    Skilled care:  exercise/HEP progression, education, modalities, man.        Plan:   Continue to progress with functional strength, add light weight to Bicep curls.  Continue with poc as documented in the [employer] system.       Current Problem  1. Traumatic complete tear of right rotator cuff, subsequent encounter            Subjective   Patient reports:  that she is starting to do more at work with some compensation.  Patient reports that she accidentally transferred a 10# weight to the R hand when lifting for legs at the gym on Sunday and has had bicep tendon pain since.  Patient reports that she has called and left a message to cancel all appointments, not a no show.     Have you fallen  since last visit:  no    What increases pain:  attempts to reach high overhead.   What decreases pain:  Meloxicam, advised to use ice and game ready with current flare up.      HEP compliance:  Yes     Precautions  Precautions  Precautions Comment: Fall Risk: none   OA, s/p Lumbar fusion 1-28-23 due to cyst, R hip labral repair several years ago, restless leg syndrome see meds in chart.     Pain  Pain Assessment: 0-10  Pain Score: 3  Pain Location: Shoulder  Pain Orientation: Right, Anterior (Bicep tendon area)    Objective   ROM   R shoulder AROM standing flexion :  0/140   elevation:  0/124  PROM supine  Flexion:  0/170  Elevation:  0/180  ER:  0/71     Therapeutic Exercise:: 38'  Therapeutic Exercise Performed: Yes  Therapeutic  "Exercise Activity 1: Nu-step: Lev 3 x 10' (subjective taken)  Therapeutic Exercise Activity 2: Pulley flex/elev/IR: x 30 ea.  Therapeutic Exercise Activity 3: Cane AAROM ER:  20/5\"**  Therapeutic Exercise Activity 4: Theraband R shoulder ext/add/IR:  Green/ 2 x 10 ea./np  Therapeutic Exercise Activity 6: Bicep curl:  2 x 10 (Add light weight NV as able.)/np  Therapeutic Exercise Activity 7: B shoulder flexion:  3 x 10/elevation to 90 with scap stab:  3 x 10     Manual Therapy  Manual Therapy Performed: Yes (2')  Manual Therapy Activity 1: PROM all planes R shoulder     Balance/Neuromuscular Re-Education  Balance/Neuromuscular Re-Education Activity Performed: Yes (2')  Balance/Neuromuscular Re-Education Activity 1: Scap pinches: 10 x 2\"**  Balance/Neuromuscular Re-Education Activity 2: Theraband lower trap squeeze:  trial Yellow NV as able.     Modalities 15'  Modalities Used: Yes  Modality 1:  (IFC /CP x 15 min 80/-150Hz/40% intensity 10/supine with bolster/)                                  OP EDUCATION:   Access Code: [email or web address]         "

## 2023-10-17 ENCOUNTER — APPOINTMENT (OUTPATIENT)
Dept: PHYSICAL THERAPY | Facility: CLINIC | Age: 48
End: 2023-10-17
Payer: COMMERCIAL

## 2023-10-19 ENCOUNTER — APPOINTMENT (OUTPATIENT)
Dept: PHYSICAL THERAPY | Facility: CLINIC | Age: 48
End: 2023-10-19
Payer: COMMERCIAL

## 2023-10-23 ENCOUNTER — APPOINTMENT (OUTPATIENT)
Dept: PHYSICAL THERAPY | Facility: CLINIC | Age: 48
End: 2023-10-23
Payer: COMMERCIAL

## 2023-10-25 ENCOUNTER — APPOINTMENT (OUTPATIENT)
Dept: PHYSICAL THERAPY | Facility: CLINIC | Age: 48
End: 2023-10-25
Payer: COMMERCIAL

## 2023-10-25 ENCOUNTER — OFFICE VISIT (OUTPATIENT)
Dept: ORTHOPEDIC SURGERY | Facility: HOSPITAL | Age: 48
End: 2023-10-25
Payer: COMMERCIAL

## 2023-10-25 DIAGNOSIS — S46.001D INJURY OF RIGHT ROTATOR CUFF, SUBSEQUENT ENCOUNTER: Primary | ICD-10-CM

## 2023-10-25 PROCEDURE — 99024 POSTOP FOLLOW-UP VISIT: CPT | Performed by: PHYSICIAN ASSISTANT

## 2023-10-25 PROCEDURE — 1036F TOBACCO NON-USER: CPT | Performed by: PHYSICIAN ASSISTANT

## 2023-10-25 ASSESSMENT — PAIN - FUNCTIONAL ASSESSMENT: PAIN_FUNCTIONAL_ASSESSMENT: NO/DENIES PAIN

## 2023-10-25 NOTE — PROGRESS NOTES
Patient is here for right shoulder post-op visit.    Patient is here today 12 weeks out from her right shoulder arthroscopy and rotator cuff repair.  Date of surgery 8/10/2023.  She is doing really well.  She really turned the corner over the past few weeks.  She has full range of motion.  Her strength progressing nicely.  She is getting continue with therapy for a few more visits just to help get her back to her desired level of strength training.  I will see her in 3 months.      Naz Cruz PA-C

## 2023-10-27 ENCOUNTER — DOCUMENTATION (OUTPATIENT)
Dept: PHYSICAL THERAPY | Facility: CLINIC | Age: 48
End: 2023-10-27
Payer: COMMERCIAL

## 2023-10-27 ENCOUNTER — APPOINTMENT (OUTPATIENT)
Dept: PHYSICAL THERAPY | Facility: CLINIC | Age: 48
End: 2023-10-27
Payer: COMMERCIAL

## 2023-10-27 NOTE — PROGRESS NOTES
Physical Therapy                 Therapy Communication Note    Patient Name: Ashtyn Yoo  MRN: [id number]  Today's Date: 10/27/2023     Discipline: Physical Therapy    Missed Visit Reason:  vertigo    Missed Time: Cancel    Comment:

## 2023-10-30 ENCOUNTER — TREATMENT (OUTPATIENT)
Dept: PHYSICAL THERAPY | Facility: CLINIC | Age: 48
End: 2023-10-30
Payer: COMMERCIAL

## 2023-10-30 ENCOUNTER — APPOINTMENT (OUTPATIENT)
Dept: PHYSICAL THERAPY | Facility: CLINIC | Age: 48
End: 2023-10-30
Payer: COMMERCIAL

## 2023-10-30 DIAGNOSIS — S46.011D TRAUMATIC COMPLETE TEAR OF RIGHT ROTATOR CUFF, SUBSEQUENT ENCOUNTER: Primary | ICD-10-CM

## 2023-10-30 PROCEDURE — 97140 MANUAL THERAPY 1/> REGIONS: CPT | Mod: GP,CQ

## 2023-10-30 PROCEDURE — 97110 THERAPEUTIC EXERCISES: CPT | Mod: GP,CQ

## 2023-10-30 NOTE — PROGRESS NOTES
Physical Therapy      Physical Therapy    Physical Therapy Treatment    Patient Name: Ashtyn Yoo  MRN: [id number]  Today's Date: 10/30/2023  Time Calculation  Start Time: 0800  Stop Time: 0845  Time Calculation (min): 45 min  Insurance reviewed   Visit number: 9   [employer]/30 visits PT/OT (none used)/ded met/90%       Assessment:  -Patient tolerated treatment well, did well with progression this date.   -updated HEP  -improved ROM today   -Needs continued work on/skilled PT for: ROM and strength    Function: Patient is progressing well with:  RTW    Skilled care:  exercise/HEP progression, education,  man.        Plan:   Continue to progress with functional strength, progress bicep strengthening.  Continue 1x week per MD visit and RTW schedule.  Continue with poc as documented in the [employer] system.       Current Problem  1. Traumatic complete tear of right rotator cuff, subsequent encounter            Subjective   Patient reports:  that she is doing is doing well , saw PA last week, allowed to work full duty with limitations of limited lifting which will change in November , she believes she will be able to go to 10#. Pain free today , now 11 weeks, 4 days post op. Ok to continue Pt 1x /week due to work schedule, Final MD visit in 2 months    Have you fallen  since last visit:  no    What increases pain:  attempts to reach high overhead initially then fine   What decreases pain:  Meloxicam, though more for back and knees    HEP compliance:  Yes     Precautions   Precautions  Precautions Comment: Fall Risk: none   OA, s/p Lumbar fusion 1-28-23 due to cyst, R hip labral repair several years ago, restless leg syndrome see meds in chart.     Pain     Pain Assessment: 0-10  Pain Score: 0  Pain Location: Shoulder  Pain Orientation: Right, Anterior (Bicep tendon area)  Objective   ROM   R shoulder AROM standing flexion :  0/160     PROM supine  Flexion:  0/170  Elevation:  0/180  ER:  0/78     Therapeutic  "Exercise:: 30'  Therapeutic Exercise Performed: Yes  Therapeutic Exercise Activity 1: Nu-step: Lev 3 x 10' (subjective taken)  Therapeutic Exercise Activity 2: Pulley flex/elev/IR: x 30 ea.  Therapeutic Exercise Activity 3: Cane AAROM ER:  20/5\"**  Therapeutic Exercise Activity 4: Theraband R shoulder ext/add/IR blue 2 x 10 ea.  Therapeutic Exercise Activity 6: Bicep curl:  2 x 10 2# **  Therapeutic Exercise Activity 7: B shoulder flexion:  3 x 10/elevation to 90 with scap stab:  3 x 10     Manual Therapy  10 min  Manual Therapy Performed: Yes (2')  Manual Therapy Activity 1: PROM all planes R shoulder     Balance/Neuromuscular Re-Education 5 min   Balance/Neuromuscular Re-Education Activity Performed: Yes (2')  Balance/Neuromuscular Re-Education Activity 1: rows grrewn  10 x 2\"**  Balance/Neuromuscular Re-Education Activity 2: Theraband lower trap squeeze:  red 2 x 10 **.        OP EDUCATION:   Access Code: [email or web address]                             OP EDUCATION:   Access Code: [email or web address]         "

## 2023-11-01 ENCOUNTER — APPOINTMENT (OUTPATIENT)
Dept: PHYSICAL THERAPY | Facility: CLINIC | Age: 48
End: 2023-11-01
Payer: COMMERCIAL

## 2023-11-01 ENCOUNTER — DOCUMENTATION (OUTPATIENT)
Dept: PHYSICAL THERAPY | Facility: CLINIC | Age: 48
End: 2023-11-01
Payer: COMMERCIAL

## 2023-11-01 NOTE — PROGRESS NOTES
Physical Therapy                 Therapy Communication Note    Patient Name: Ashtyn Yoo  MRN: [id number]  Today's Date: 11/1/2023     Discipline: Physical Therapy    Missed Visit Reason:  decreased to one time/week per MD    Missed Time: Cancel    Comment:

## 2023-11-06 ENCOUNTER — APPOINTMENT (OUTPATIENT)
Dept: PHYSICAL THERAPY | Facility: CLINIC | Age: 48
End: 2023-11-06
Payer: COMMERCIAL

## 2023-11-08 ENCOUNTER — APPOINTMENT (OUTPATIENT)
Dept: PHYSICAL THERAPY | Facility: CLINIC | Age: 48
End: 2023-11-08
Payer: COMMERCIAL

## 2023-11-15 ENCOUNTER — APPOINTMENT (OUTPATIENT)
Dept: PHYSICAL THERAPY | Facility: CLINIC | Age: 48
End: 2023-11-15
Payer: COMMERCIAL

## 2023-11-17 ENCOUNTER — APPOINTMENT (OUTPATIENT)
Dept: PHYSICAL THERAPY | Facility: CLINIC | Age: 48
End: 2023-11-17
Payer: COMMERCIAL

## 2023-11-21 ENCOUNTER — APPOINTMENT (OUTPATIENT)
Dept: PHYSICAL THERAPY | Facility: CLINIC | Age: 48
End: 2023-11-21
Payer: COMMERCIAL

## 2023-12-26 ENCOUNTER — TELEPHONE (OUTPATIENT)
Dept: PRIMARY CARE | Facility: CLINIC | Age: 48
End: 2023-12-26
Payer: COMMERCIAL

## 2023-12-26 DIAGNOSIS — M25.512 PAIN IN LEFT SHOULDER: ICD-10-CM

## 2023-12-26 RX ORDER — MELOXICAM 15 MG/1
15 TABLET ORAL DAILY
Qty: 90 TABLET | Refills: 0 | Status: SHIPPED | OUTPATIENT
Start: 2023-12-26 | End: 2024-05-14 | Stop reason: SDUPTHER

## 2023-12-28 ENCOUNTER — DOCUMENTATION (OUTPATIENT)
Dept: PHYSICAL THERAPY | Facility: CLINIC | Age: 48
End: 2023-12-28
Payer: COMMERCIAL

## 2023-12-28 NOTE — PROGRESS NOTES
Physical Therapy    Discharge Summary    Name: Ashtyn Yoo  MRN: [id number]  : 1975  Date: 23    Discharge Summary: PT    Discharge Information: Date of last visit 10-30-23    Therapy Summary: patient was progressing well, cancelled several appointments and has not called to re-schedule and is currently being discharged, unable to re-assess goals.     Discharge Status: per last visit.      Rehab Discharge Reason: Failed to schedule and/or keep follow-up appointment(s)

## 2024-01-10 ENCOUNTER — ANCILLARY PROCEDURE (OUTPATIENT)
Dept: RADIOLOGY | Facility: CLINIC | Age: 49
End: 2024-01-10
Payer: COMMERCIAL

## 2024-01-10 DIAGNOSIS — M48.062 NEUROGENIC CLAUDICATION DUE TO LUMBAR SPINAL STENOSIS: Primary | ICD-10-CM

## 2024-01-10 DIAGNOSIS — M48.062 NEUROGENIC CLAUDICATION DUE TO LUMBAR SPINAL STENOSIS: ICD-10-CM

## 2024-01-10 PROCEDURE — 72110 X-RAY EXAM L-2 SPINE 4/>VWS: CPT | Performed by: RADIOLOGY

## 2024-01-10 PROCEDURE — 72110 X-RAY EXAM L-2 SPINE 4/>VWS: CPT

## 2024-01-24 ENCOUNTER — OFFICE VISIT (OUTPATIENT)
Dept: ORTHOPEDIC SURGERY | Facility: HOSPITAL | Age: 49
End: 2024-01-24
Payer: COMMERCIAL

## 2024-01-24 DIAGNOSIS — S46.001D INJURY OF RIGHT ROTATOR CUFF, SUBSEQUENT ENCOUNTER: Primary | ICD-10-CM

## 2024-01-24 PROCEDURE — 1036F TOBACCO NON-USER: CPT | Performed by: PHYSICIAN ASSISTANT

## 2024-01-24 PROCEDURE — 99213 OFFICE O/P EST LOW 20 MIN: CPT | Performed by: PHYSICIAN ASSISTANT

## 2024-01-24 NOTE — PROGRESS NOTES
HPI:   Patient returns for post-op follow-up of right shoulder scope DOS 8/10/23.  She states she has been doing well and denies having any pain in the right shoulder. She completed PT and is currently strength training at the gym using dumbbells. She states after completing PT, her ROM is intact except adduction of the right shoulder. Although mildly decreased, it's not seeming to cause her significant pain or limitations. She has no questions or concerns about her shoulder today.    ROS: All pertinent positive and negatives mentioned above in the HPI.    EXAM: RUE  Skin is intact, wounds without drainage, swelling, or dehiscence  R shoulder is non-tender to palpation  ROM intact, mildly decreased with adduction of the R shoulder  Strength 5/5  Negative Roe test    ASSESSMENT:  S/p right shoulder scope; DOS: 8/10/23    PLAN:  Patient has completed PT and is doing well. Continue strength training as tolerated.  Follow up as needed.

## 2024-02-13 ENCOUNTER — APPOINTMENT (OUTPATIENT)
Dept: PRIMARY CARE | Facility: CLINIC | Age: 49
End: 2024-02-13
Payer: COMMERCIAL

## 2024-05-06 NOTE — OP NOTE
PREOPERATIVE DIAGNOSIS:  1. Right shoulder rotator cuff tear.  2. Biceps tendinosis.  3. Superior labral tear.  4. Subacromial impingement.    POSTOPERATIVE DIAGNOSIS:  1. Right shoulder rotator cuff tear.  2. Biceps tendinosis.  3. Superior labral tear.  4. Subacromial impingement.    OPERATION/PROCEDURE:  1. Right shoulder arthroscopy with limited intra-articular  debridement.   2. Subacromial decompression with formal acromioplasty for a type 2  acromion.   3. Double-row rotator cuff repair for a leading edge of the  supraspinatus tear.   4. Mini open subpectoral biceps tenodesis.    SURGEON:  Quang Maria MD.    ASSISTANT(S):    ANESTHESIA:  General.    COMPLICATION:  None.    BLOOD LOSS:  Minimal.    INDICATION FOR PROCEDURE:  The patient is a pleasant female, who presented to my office with  persistent right-sided shoulder pain after sustaining an injury while  weightlifting.  An MRI scan revealed a full-thickness tear of the  leading edge of the supraspinatus as well as biceps tendinosis, and  SLAP tear and subacromial impingement.  We had spoken to her about  her options including continued nonoperative versus operative  intervention.  She elected for operative intervention after  understanding the risks and benefits of surgery which included, but  were not limited to, bleeding, infection, damage to nerves or blood  vessels, need for further procedures, risks of anesthesia, blood  clots, progression of osteoarthritis, postoperative stiffness, risk  of re-tear.  The patient understood these risks and wished to proceed  with the operative intervention.     PROCEDURE AND FINDINGS:  The patient was identified in the preoperative holding area.  Operative extremity was marked with an indelible marker and informed  consent reviewed.  She was then taken to the operating room, where a  time-out was performed verifying correct site, side, procedure, and  our special equipment.  She was placed initially  supine on the  operating room table.  All bony prominences were well padded.  SCDs  were placed for DVT prophylaxis.  Antibiotics were infused  intravenously.  She was anesthetized and placed in the beach chair  position.  Preoperative range of motion was preserved.  Once she was  prepped and draped in the usual sterile fashion, we began by making a  standard posterior viewing portal.  Upon entering the joint, we  identified that she had some fraying at the upper border of the  subscap but the tendon was intact.  This fraying was debrided.  Biceps was bulbous and had some tearing within it.  It was clipped  and retrieved for later tenodesis.  A limited intra-articular  debridement was done of her superior labral tear.  The articular  cartilage was intact.  The undersurface of the rotator cuff revealed  a full-thickness tear.  We turned our attention to the subacromial  space.  A formal acromioplasty and subacromial decompression were  thoroughly performed.  We then identified the patient's  full-thickness tear of her supraspinatus.  The footprint was denuded  with a bur.  We placed medial row anchors, these were passed through  the rotator cuff and tied down with alternating half hitches in a  mattress configuration.  These were then brought down to lateral row  anchor, we noted excellent fixation of the rotator cuff and then  ranged it to see that the repair was stable.  We then turned our  attention.  We drained the shoulder, withdrew the arthroscope, turned  our attention to the tenodesis.  An incision was made near the axilla  through skin through subcutaneous tissue down to the interval between  the short head of the biceps and the pectoralis.  This interval was  bluntly opened.  We identified the patient's stump of the biceps,  preserving this 1 cm closest to the musculotendinous junction with  stitching and then performed unicortical tenodesis with the Arthrex  system.  We then copiously irrigated the wounds.   We closed them in  layers, applied a sterile bandage.  The patient awoke from anesthesia  without complication, transported to PACU in stable condition.     Postoperative course will be standard rotator cuff repair protocol.      Quang Maria MD    DD:  08/17/2023 13:45:25 EST  DT:  08/18/2023 07:45:48 EST  DICTATION NUMBER:  [id number]  INTERNAL JOB NUMBER:  [id number]    CC:  Quang Maria MD, Fax: [phone]        Electronic Signatures:  Quang Maria) (Signed on 28-Aug-2023 20:00)   Authored  Unsigned, Draft (SYS GENERATED) (Entered on 18-Aug-2023 07:45)   Entered    Last Updated: 28-Aug-2023 20:00 by Quang Maria)

## 2024-05-09 ENCOUNTER — APPOINTMENT (OUTPATIENT)
Dept: PRIMARY CARE | Facility: CLINIC | Age: 49
End: 2024-05-09
Payer: COMMERCIAL

## 2024-05-13 PROBLEM — N90.89 VULVAR LESION: Status: RESOLVED | Noted: 2022-03-22 | Resolved: 2024-05-13

## 2024-05-13 PROBLEM — R79.89 ELEVATED SERUM CREATININE: Status: RESOLVED | Noted: 2023-10-04 | Resolved: 2024-05-13

## 2024-05-13 PROBLEM — F41.9 ANXIETY: Status: RESOLVED | Noted: 2023-06-04 | Resolved: 2024-05-13

## 2024-05-13 PROBLEM — S46.011A TRAUMATIC COMPLETE TEAR OF RIGHT ROTATOR CUFF: Status: RESOLVED | Noted: 2023-10-04 | Resolved: 2024-05-13

## 2024-05-13 PROBLEM — M25.569 ARTHRALGIA OF KNEE: Status: RESOLVED | Noted: 2023-06-04 | Resolved: 2024-05-13

## 2024-05-13 PROBLEM — R93.89 ABNORMAL X-RAY: Status: RESOLVED | Noted: 2023-10-04 | Resolved: 2024-05-13

## 2024-05-13 PROBLEM — R79.9 ELEVATED BUN: Status: RESOLVED | Noted: 2023-10-04 | Resolved: 2024-05-13

## 2024-05-13 PROBLEM — M25.50 ARTHRALGIA: Status: RESOLVED | Noted: 2023-06-04 | Resolved: 2024-05-13

## 2024-05-13 ASSESSMENT — ENCOUNTER SYMPTOMS
POLYPHAGIA: 0
DYSURIA: 0
DIZZINESS: 0
POLYDIPSIA: 0
BRUISES/BLEEDS EASILY: 0
EYE REDNESS: 0
NECK PAIN: 0
HALLUCINATIONS: 0
BACK PAIN: 0
EYE PAIN: 0
APPETITE CHANGE: 0
HEMATURIA: 0
PALPITATIONS: 0
UNEXPECTED WEIGHT CHANGE: 0
ADENOPATHY: 0
SORE THROAT: 0
HEADACHES: 0
COUGH: 0
EYE DISCHARGE: 0
BLOOD IN STOOL: 0
FREQUENCY: 0
SHORTNESS OF BREATH: 0
CHILLS: 0
TROUBLE SWALLOWING: 0
VOMITING: 0
WOUND: 0
ABDOMINAL PAIN: 0
CONFUSION: 0
FEVER: 0
FATIGUE: 0

## 2024-05-13 NOTE — PROGRESS NOTES
Subjective   Patient ID: Ashtyn Yoo is a 48 y.o. female who presents for Annual Exam and Med Refill.      Is pt fasting? no  Does pt see any providers other than care team below: no  lu Barron, miguel, alexey, felicia, yissel  Name of derm Karen Weaver    Any forms to fill out?  Is pt on bupropion 150 or 300? On 300 mg  Is pt on gabapentin 100 or 300? Both prn  When was last pap? 4/25/18 cally leigh  If in the last 2yrs, maco -on file  When was last mammo? At least two year  If in the last 12mon, ask kevin to look in cerner for result.  Any other questions or concerns that pt wants to discuss today? no    Phq9=9   , gad7=7      No care team member to display    HPI  Last labs-8/2023 cbc nl, cmp nl; 12/2022 tsh nl, ldl 146   Due for labs- all    Cholesterol   Date Value Ref Range Status   12/07/2022 215 (H) 0 - 199 mg/dL Final     Comment:     .      AGE      DESIRABLE   BORDERLINE HIGH   HIGH     0-19 Y     0 - 169       170 - 199     >/= 200    20-24 Y     0 - 189       190 - 224     >/= 225         >24 Y     0 - 199       200 - 239     >/= 240   **All ranges are based on fasting samples. Specific   therapeutic targets will vary based on patient-specific   cardiac risk.  .   Pediatric guidelines reference:Pediatrics 2011, 128(S5).   Adult guidelines reference: NCEP ATPIII Guidelines,     HELLEN 2001, 258:2486-97  .   Venipuncture immediately after or during the    administration of Metamizole may lead to falsely   low results. Testing should be performed immediately   prior to Metamizole dosing.     10/05/2021 163 0 - 199 mg/dL Final     Comment:     .      AGE      DESIRABLE   BORDERLINE HIGH   HIGH     0-19 Y     0 - 169       170 - 199     >/= 200    20-24 Y     0 - 189       190 - 224     >/= 225         >24 Y     0 - 199       200 - 239     >/= 240   **All ranges are based on fasting samples. Specific   therapeutic targets will vary based on patient-specific   cardiac risk.  .   Pediatric guidelines  reference:Pediatrics 2011, 128(S5).   Adult guidelines reference: NCEP ATPIII Guidelines,     HELLEN 2001, 258:2486-97  .   Venipuncture immediately after or during the    administration of Metamizole may lead to falsely   low results. Testing should be performed immediately   prior to Metamizole dosing.     09/27/2019 213 (H) 0 - 199 mg/dL Final     Comment:     .      AGE      DESIRABLE   BORDERLINE HIGH   HIGH     0-19 Y     0 - 169       170 - 199     >/= 200    20-24 Y     0 - 189       190 - 224     >/= 225         >24 Y     0 - 199       200 - 239     >/= 240   **All ranges are based on fasting samples. Specific   therapeutic targets will vary based on patient-specific   cardiac risk.  .   Pediatric guidelines reference:Pediatrics 2011, 128(S5).   Adult guidelines reference: NCEP ATPIII Guidelines,     HELLEN 2001, 258:2486-97  .   Venipuncture immediately after or during the    administration of Metamizole may lead to falsely   low results. Testing should be performed immediately   prior to Metamizole dosing.       Triglycerides   Date Value Ref Range Status   12/07/2022 122 0 - 149 mg/dL Final     Comment:     .      AGE      DESIRABLE   BORDERLINE HIGH   HIGH     VERY HIGH   0 D-90 D    19 - 174         ----         ----        ----  91 D- 9 Y     0 -  74        75 -  99     >/= 100      ----    10-19 Y     0 -  89        90 - 129     >/= 130      ----    20-24 Y     0 - 114       115 - 149     >/= 150      ----         >24 Y     0 - 149       150 - 199    200- 499    >/= 500  .   Venipuncture immediately after or during the    administration of Metamizole may lead to falsely   low results. Testing should be performed immediately   prior to Metamizole dosing.     10/05/2021 63 0 - 149 mg/dL Final     Comment:     .      AGE      DESIRABLE   BORDERLINE HIGH   HIGH     VERY HIGH   0 D-90 D    19 - 174         ----         ----        ----  91 D- 9 Y     0 -  74        75 -  99     >/= 100      ----    10-19 Y     " 0 -  89        90 - 129     >/= 130      ----    20-24 Y     0 - 114       115 - 149     >/= 150      ----         >24 Y     0 - 149       150 - 199    200- 499    >/= 500  .   Venipuncture immediately after or during the    administration of Metamizole may lead to falsely   low results. Testing should be performed immediately   prior to Metamizole dosing.     09/27/2019 81 0 - 149 mg/dL Final     Comment:     .      AGE      DESIRABLE   BORDERLINE HIGH   HIGH     VERY HIGH   0 D-90 D    19 - 174         ----         ----        ----  91 D- 9 Y     0 -  74        75 -  99     >/= 100      ----    10-19 Y     0 -  89        90 - 129     >/= 130      ----    20-24 Y     0 - 114       115 - 149     >/= 150      ----         >24 Y     0 - 149       150 - 199    200- 499    >/= 500  .   Venipuncture immediately after or during the    administration of Metamizole may lead to falsely   low results. Testing should be performed immediately   prior to Metamizole dosing.       HDL   Date Value Ref Range Status   12/07/2022 44.2 mg/dL Final     Comment:     .      AGE      VERY LOW   LOW     NORMAL    HIGH       0-19 Y       < 35   < 40     40-45     ----    20-24 Y       ----   < 40       >45     ----      >24 Y       ----   < 40     40-60      >60  .     10/05/2021 72.0 mg/dL Final     Comment:     .      AGE      VERY LOW   LOW     NORMAL    HIGH       0-19 Y       < 35   < 40     40-45     ----    20-24 Y       ----   < 40       >45     ----      >24 Y       ----   < 40     40-60      >60  .     09/27/2019 94.1 mg/dL Final     Comment:     .      AGE      VERY LOW   LOW     NORMAL    HIGH       0-19 Y       < 35   < 40     40-45     ----    20-24 Y       ----   < 40       >45     ----      >24 Y       ----   < 40     40-60      >60  .       No results found for: \"LDL\"  TSH   Date Value Ref Range Status   12/07/2022 1.17 0.44 - 3.98 mIU/L Final     Comment:      TSH testing is performed using different testing    " "methodology at [facility] than at other    [facility]. Direct result comparisons should    only be made within the same method.       No results found for: \"A1C\"  No components found for: \"POCA1C\"  No results found for: \"ALBUR\"  No components found for: \"POCALBUR\"      Other concerns: none    bps at home- none    ER/urgicare visits in the last year- none  Hospitalizations in the last year- none      last Pap- 4/2018; due 4/2023  H/o abn pap-none    FH ovarian, cervical, uterine ca-none    Current birth control method-mirena  No change in contraception desired    last mammo- (40-75/90) 2022; due    FH br ca-none    last colonoscopy/cologuard/FIT (45-75) 11/2021; due 11/2028   FH colon ca-none      Exercise- cardio and wts  Diet-2-3meals   Body mass index is 24.55 kg/m².    last eye dr appt- glasses-2wks  No vision issues    last dental appt- 1-2mon ago    BMs-regular-alt diarrhea and constipation  Sleep-hard to fall asleep and stay asleep for yrs and yrs; no snoring or apnea  no cp, swelling, sob, abd pain, n/v/d/c, blood in stool or black stools  STI testing including hiv (age 15-65) and hep c screening (18-79)-declines        Immunization History   Administered Date(s) Administered    Flu vaccine (IIV4), preservative free *Check age/dose* 10/04/2021, 09/01/2023    Influenza, seasonal, intradermal, preservative free 12/23/2014    Moderna SARS-CoV-2 Vaccination 12/30/2020, 01/26/2021, 11/15/2021    Tdap vaccine, age 7 year and older (BOOSTRIX, ADACEL) 05/27/2022         fractures in lifetime-ankle, wrist, foot  Anyone with osteoporosis in the family-mgm    FH heart attack, heart surgery-none  FH stroke-none    The 10-year ASCVD risk score (Ruslan KIM, et al., 2019) is: 1.4%    Values used to calculate the score:      Age: 48 years      Sex: Female      Is Non- : No      Diabetic: No      Tobacco smoker: No      Systolic Blood Pressure: 117 mmHg      Is BP treated: No      " HDL Cholesterol: 44.2 mg/dL      Total Cholesterol: 215 mg/dL  Coronary Artery Calcium score:  This test is recommended for men 45 or older and women 55 or older without a history of heart disease and have 1 risk factor (high LDL cholesterol, low HDL cholesterol, high blood pressure, smoker (current or past), type 2 diabetes, IBD, lupus, RA, ankylosing spondylitis, psoriasis or family history of  heart disease <55yrs in dad, brother or child or <65yrs in mom, sister, or child.)       Review of Systems   Constitutional:  Negative for appetite change, chills, fatigue, fever and unexpected weight change.   HENT:  Negative for congestion, ear pain, sore throat and trouble swallowing.    Eyes:  Negative for pain, discharge and redness.   Respiratory:  Negative for cough and shortness of breath.    Cardiovascular:  Negative for chest pain and palpitations.   Gastrointestinal:  Negative for abdominal pain, blood in stool and vomiting.   Endocrine: Negative for polydipsia, polyphagia and polyuria.   Genitourinary:  Negative for dysuria, frequency, hematuria and urgency.   Musculoskeletal:  Negative for back pain and neck pain.   Skin:  Negative for rash and wound.   Allergic/Immunologic: Negative for immunocompromised state.   Neurological:  Negative for dizziness, syncope and headaches.   Hematological:  Negative for adenopathy. Does not bruise/bleed easily.   Psychiatric/Behavioral:  Negative for confusion and hallucinations.        Objective   Visit Vitals  /79   Pulse 75   Temp 36.4 °C (97.5 °F)   Resp 18      BP Readings from Last 3 Encounters:   05/14/24 117/79   09/01/23 129/85   12/09/22 124/74     Wt Readings from Last 3 Encounters:   05/14/24 64.9 kg (143 lb)   09/01/23 69.4 kg (153 lb)   12/27/22 70.3 kg (155 lb)           Physical Exam  Constitutional:       General: She is not in acute distress.     Appearance: Normal appearance. She is not ill-appearing.   HENT:      Head: Normocephalic.      Right Ear:  Tympanic membrane, ear canal and external ear normal.      Left Ear: Tympanic membrane, ear canal and external ear normal.      Nose: Nose normal.      Mouth/Throat:      Mouth: Mucous membranes are moist.      Pharynx: Oropharynx is clear.   Eyes:      Extraocular Movements: Extraocular movements intact.      Conjunctiva/sclera: Conjunctivae normal.      Pupils: Pupils are equal, round, and reactive to light.   Cardiovascular:      Rate and Rhythm: Normal rate and regular rhythm.      Heart sounds: Normal heart sounds. No murmur heard.  Pulmonary:      Effort: Pulmonary effort is normal. No respiratory distress.      Breath sounds: Normal breath sounds. No wheezing, rhonchi or rales.   Abdominal:      General: Bowel sounds are normal.      Palpations: Abdomen is soft. There is no mass.      Tenderness: There is no abdominal tenderness.   Musculoskeletal:         General: No swelling or tenderness. Normal range of motion.      Cervical back: Normal range of motion and neck supple.      Right lower leg: No edema.      Left lower leg: No edema.   Skin:     General: Skin is warm.      Findings: No rash.   Neurological:      General: No focal deficit present.      Mental Status: She is alert and oriented to person, place, and time.      Cranial Nerves: No cranial nerve deficit.      Motor: No weakness.   Psychiatric:         Mood and Affect: Mood normal.         Behavior: Behavior normal.         Assessment/Plan   Diagnoses and all orders for this visit:  Routine general medical examination at a health care facility  -     Comprehensive Metabolic Panel; Future  -     CBC and Auto Differential; Future  -     Lipid Panel; Future  -     TSH with reflex to Free T4 if abnormal; Future  Anxiety  -     buPROPion XL (Wellbutrin XL) 300 mg 24 hr tablet; Take 1 tablet (300 mg) by mouth once daily. Do not crush, chew, or split.  Mild episode of recurrent major depressive disorder (CMS-HCC)  -     buPROPion XL (Wellbutrin XL) 300  mg 24 hr tablet; Take 1 tablet (300 mg) by mouth once daily. Do not crush, chew, or split.  Pain in left shoulder  -     meloxicam (Mobic) 15 mg tablet; Take 1 tablet (15 mg) by mouth once daily. Take with food.  BMI 24.0-24.9, adult  Other orders  -     Follow Up In Primary Care - Health Maintenance; Future

## 2024-05-14 ENCOUNTER — OFFICE VISIT (OUTPATIENT)
Dept: PRIMARY CARE | Facility: CLINIC | Age: 49
End: 2024-05-14
Payer: COMMERCIAL

## 2024-05-14 VITALS
RESPIRATION RATE: 18 BRPM | WEIGHT: 143 LBS | DIASTOLIC BLOOD PRESSURE: 79 MMHG | SYSTOLIC BLOOD PRESSURE: 117 MMHG | BODY MASS INDEX: 24.41 KG/M2 | TEMPERATURE: 97.5 F | HEART RATE: 75 BPM | OXYGEN SATURATION: 96 % | HEIGHT: 64 IN

## 2024-05-14 DIAGNOSIS — F41.9 ANXIETY: ICD-10-CM

## 2024-05-14 DIAGNOSIS — F33.0 MILD EPISODE OF RECURRENT MAJOR DEPRESSIVE DISORDER (CMS-HCC): ICD-10-CM

## 2024-05-14 DIAGNOSIS — M25.512 PAIN IN LEFT SHOULDER: ICD-10-CM

## 2024-05-14 DIAGNOSIS — Z00.00 ROUTINE GENERAL MEDICAL EXAMINATION AT A HEALTH CARE FACILITY: Primary | ICD-10-CM

## 2024-05-14 PROCEDURE — 99396 PREV VISIT EST AGE 40-64: CPT | Performed by: NURSE PRACTITIONER

## 2024-05-14 PROCEDURE — 3008F BODY MASS INDEX DOCD: CPT | Performed by: NURSE PRACTITIONER

## 2024-05-14 PROCEDURE — 1036F TOBACCO NON-USER: CPT | Performed by: NURSE PRACTITIONER

## 2024-05-14 RX ORDER — BUPROPION HYDROCHLORIDE 300 MG/1
300 TABLET ORAL DAILY
Qty: 90 TABLET | Refills: 4 | Status: SHIPPED | OUTPATIENT
Start: 2024-05-14

## 2024-05-14 RX ORDER — MELOXICAM 15 MG/1
15 TABLET ORAL DAILY
Qty: 90 TABLET | Refills: 4 | Status: SHIPPED | OUTPATIENT
Start: 2024-05-14 | End: 2024-05-30

## 2024-05-14 ASSESSMENT — ANXIETY QUESTIONNAIRES
5. BEING SO RESTLESS THAT IT IS HARD TO SIT STILL: SEVERAL DAYS
4. TROUBLE RELAXING: SEVERAL DAYS
6. BECOMING EASILY ANNOYED OR IRRITABLE: SEVERAL DAYS
3. WORRYING TOO MUCH ABOUT DIFFERENT THINGS: SEVERAL DAYS
GAD7 TOTAL SCORE: 7
IF YOU CHECKED OFF ANY PROBLEMS ON THIS QUESTIONNAIRE, HOW DIFFICULT HAVE THESE PROBLEMS MADE IT FOR YOU TO DO YOUR WORK, TAKE CARE OF THINGS AT HOME, OR GET ALONG WITH OTHER PEOPLE: SOMEWHAT DIFFICULT
7. FEELING AFRAID AS IF SOMETHING AWFUL MIGHT HAPPEN: SEVERAL DAYS
2. NOT BEING ABLE TO STOP OR CONTROL WORRYING: SEVERAL DAYS
1. FEELING NERVOUS, ANXIOUS, OR ON EDGE: SEVERAL DAYS

## 2024-05-14 ASSESSMENT — PATIENT HEALTH QUESTIONNAIRE - PHQ9
7. TROUBLE CONCENTRATING ON THINGS, SUCH AS READING THE NEWSPAPER OR WATCHING TELEVISION: SEVERAL DAYS
5. POOR APPETITE OR OVEREATING: SEVERAL DAYS
SUM OF ALL RESPONSES TO PHQ9 QUESTIONS 1 AND 2: 2
6. FEELING BAD ABOUT YOURSELF - OR THAT YOU ARE A FAILURE OR HAVE LET YOURSELF OR YOUR FAMILY DOWN: SEVERAL DAYS
SUM OF ALL RESPONSES TO PHQ QUESTIONS 1-9: 8
3. TROUBLE FALLING OR STAYING ASLEEP OR SLEEPING TOO MUCH: SEVERAL DAYS
10. IF YOU CHECKED OFF ANY PROBLEMS, HOW DIFFICULT HAVE THESE PROBLEMS MADE IT FOR YOU TO DO YOUR WORK, TAKE CARE OF THINGS AT HOME, OR GET ALONG WITH OTHER PEOPLE: SOMEWHAT DIFFICULT
SUM OF ALL RESPONSES TO PHQ9 QUESTIONS 1 AND 2: 2
8. MOVING OR SPEAKING SO SLOWLY THAT OTHER PEOPLE COULD HAVE NOTICED. OR THE OPPOSITE, BEING SO FIGETY OR RESTLESS THAT YOU HAVE BEEN MOVING AROUND A LOT MORE THAN USUAL: SEVERAL DAYS
1. LITTLE INTEREST OR PLEASURE IN DOING THINGS: SEVERAL DAYS
1. LITTLE INTEREST OR PLEASURE IN DOING THINGS: SEVERAL DAYS
2. FEELING DOWN, DEPRESSED OR HOPELESS: SEVERAL DAYS
10. IF YOU CHECKED OFF ANY PROBLEMS, HOW DIFFICULT HAVE THESE PROBLEMS MADE IT FOR YOU TO DO YOUR WORK, TAKE CARE OF THINGS AT HOME, OR GET ALONG WITH OTHER PEOPLE: SOMEWHAT DIFFICULT
4. FEELING TIRED OR HAVING LITTLE ENERGY: SEVERAL DAYS
2. FEELING DOWN, DEPRESSED OR HOPELESS: SEVERAL DAYS
9. THOUGHTS THAT YOU WOULD BE BETTER OFF DEAD, OR OF HURTING YOURSELF: NOT AT ALL

## 2024-05-14 NOTE — PATIENT INSTRUCTIONS
I recommend to set up a pap  I recommend a mammogram    Meds refilled. The number of refills on the meds match when you need to return to the office for an appt. I recommend making your next appt today so you don't run out of your medication as it may take me up to 3 days to refill it.    Handouts given to pt:  physical handout        Labs:    You can use the lab in our building when fasting. The hrs are: Monday-Friday, 7 a.m. - 5 p.m., Saturday 8 a.m. - 12 noon.   No appt needed, BUT YOU DO NEED THE PAPER ORDER.    Fasting is no food, drink, gum or mints other than water for 12 hrs.   Results will be back in 2-3 business days for most labs. It is always recommended for any orders (labs, xrays, ultrasounds,MRI, ct scan, procedures etc) to check with your insurance provider for expected costs or expenses to you.         You will get your results via phone from my medical assistant if you do not have MyChart.  OR  You will get your results via [employer]    If a result is urgent, I will call to speak to you.    Vaccines:  Up to date    General recommendations:  Exercise-cardio 4-5d/wk 30min each day  Diet-Breakfast-toast (my favorite Lorna Doss Delighful Multigrain or Timothy's Killer Bread Good Seed thin-sliced)/bagel/English muffin-whole wheat flour as a 1st ingredient or cereal/oatmeal/granola bar-fiber 4g or more or protein like eggs or peanut butter; optional veggies  Lunch-protein, 1/2c carb or 2 slices bread, veg 1c  Dinner-protein, fist sized carb, veg 1c  Fruit 2 a day  Dairy 2 a day-milk, soy milk, almond milk, cheese, yogurt, cottage cheese  Snacks-Protein-hard boiled egg, nuts (walnuts/almonds/pecans/pistachios 1/4c), hummus, beef/deer jerky or meat sticks; vegetable, fruit, dairy-milk(1%, skim, almond, soy)/cheese (not a lot of cheddar)/yogurt (Greek is best-my favorite Dannon Fruit on the Bottom Greek)/cottage cheese 2%; triscuits/ popcorn/wheat thins have a lot of fiber; follow serving size on  bag/box/container  increase water  Limit alcohol to 1 drink per day for women and 2 drinks per day for men (1 drink=12oz beer or 5oz wine or 1 1/2oz liquor)  Calcium: 500mg 1 twice a day if age 50 and younger and 600mg 1 twice a day if over age 50 (calcium citrate can be taken without food)  Vitamin D: 800-5000 IU/day  Limit salt to <2300mg a day if age 50 and under and <1500mg a day if over age 50/have high bp or diabetes or kidney disease  Recommend folate for childbearing age women 0.4mg per day (can be found in a multivitamin)  Recommend 18mg/dL of iron a day if age 50 and under and 8mg/dL a day if over age 50; take on an empty stomach at bedtime  Use sunscreen   Wear seatbelt  Recommend safe sex practices: using condoms everytime you have sex, discuss with a new partner about their past partners/history of STDs/drug use, avoid drinking alcohol or using drugs as this increases the chance that you will participate in high-risk sex, for oral sex help protect your mouth by having your partner use a condom (male or female), women should not douche after sex, be aware of your partner's body and your body-look for signs of a sore, blister, rash, or discharge, and have regular exams and periodic tests for STDs.  No distracted driving  No driving when under influence of substances  Wear a seatbelt  Eye dr every 1-2yrs  Dentist every 6-12 mon  Tetanus shot every 10yrs  Recommend flu vaccine in the fall  Appt in 1 year for physical      I will communicate with you via [email or web address] regarding messages and results. If you need help with this, you can call the support line at [phone].    IT WAS A PLEASURE TO SEE YOU TODAY. THANK YOU FOR CHOOSING US FOR YOUR HEALTHCARE NEEDS.

## 2024-05-15 ENCOUNTER — LAB (OUTPATIENT)
Dept: LAB | Facility: LAB | Age: 49
End: 2024-05-15
Payer: COMMERCIAL

## 2024-05-15 DIAGNOSIS — D72.829 LEUKOCYTOSIS, UNSPECIFIED TYPE: ICD-10-CM

## 2024-05-15 DIAGNOSIS — N28.9 DECREASED RENAL FUNCTION: Primary | ICD-10-CM

## 2024-05-15 DIAGNOSIS — Z00.00 ROUTINE GENERAL MEDICAL EXAMINATION AT A HEALTH CARE FACILITY: ICD-10-CM

## 2024-05-15 LAB
ALBUMIN SERPL BCP-MCNC: 4.2 G/DL (ref 3.4–5)
ALP SERPL-CCNC: 63 U/L (ref 33–110)
ALT SERPL W P-5'-P-CCNC: 32 U/L (ref 7–45)
ANION GAP SERPL CALC-SCNC: 12 MMOL/L (ref 10–20)
AST SERPL W P-5'-P-CCNC: 29 U/L (ref 9–39)
BASOPHILS # BLD AUTO: 0.08 X10*3/UL (ref 0–0.1)
BASOPHILS NFR BLD AUTO: 0.6 %
BILIRUB SERPL-MCNC: 0.6 MG/DL (ref 0–1.2)
BUN SERPL-MCNC: 21 MG/DL (ref 6–23)
CALCIUM SERPL-MCNC: 9.3 MG/DL (ref 8.6–10.3)
CHLORIDE SERPL-SCNC: 105 MMOL/L (ref 98–107)
CHOLEST SERPL-MCNC: 172 MG/DL (ref 0–199)
CHOLESTEROL/HDL RATIO: 4.7
CO2 SERPL-SCNC: 29 MMOL/L (ref 21–32)
CREAT SERPL-MCNC: 1.16 MG/DL (ref 0.5–1.05)
EGFRCR SERPLBLD CKD-EPI 2021: 58 ML/MIN/1.73M*2
EOSINOPHIL # BLD AUTO: 0.28 X10*3/UL (ref 0–0.7)
EOSINOPHIL NFR BLD AUTO: 2.2 %
ERYTHROCYTE [DISTWIDTH] IN BLOOD BY AUTOMATED COUNT: 12.6 % (ref 11.5–14.5)
GLUCOSE SERPL-MCNC: 72 MG/DL (ref 74–99)
HCT VFR BLD AUTO: 48.4 % (ref 36–46)
HDLC SERPL-MCNC: 36.7 MG/DL
HGB BLD-MCNC: 15.8 G/DL (ref 12–16)
IMM GRANULOCYTES # BLD AUTO: 0.05 X10*3/UL (ref 0–0.7)
IMM GRANULOCYTES NFR BLD AUTO: 0.4 % (ref 0–0.9)
LDLC SERPL CALC-MCNC: 118 MG/DL
LYMPHOCYTES # BLD AUTO: 3.12 X10*3/UL (ref 1.2–4.8)
LYMPHOCYTES NFR BLD AUTO: 24.9 %
MCH RBC QN AUTO: 31.5 PG (ref 26–34)
MCHC RBC AUTO-ENTMCNC: 32.6 G/DL (ref 32–36)
MCV RBC AUTO: 96 FL (ref 80–100)
MONOCYTES # BLD AUTO: 1.09 X10*3/UL (ref 0.1–1)
MONOCYTES NFR BLD AUTO: 8.7 %
NEUTROPHILS # BLD AUTO: 7.9 X10*3/UL (ref 1.2–7.7)
NEUTROPHILS NFR BLD AUTO: 63.2 %
NON HDL CHOLESTEROL: 135 MG/DL (ref 0–149)
NRBC BLD-RTO: 0 /100 WBCS (ref 0–0)
PLATELET # BLD AUTO: 378 X10*3/UL (ref 150–450)
POTASSIUM SERPL-SCNC: 4.3 MMOL/L (ref 3.5–5.3)
PROT SERPL-MCNC: 6.3 G/DL (ref 6.4–8.2)
RBC # BLD AUTO: 5.02 X10*6/UL (ref 4–5.2)
SODIUM SERPL-SCNC: 142 MMOL/L (ref 136–145)
TRIGL SERPL-MCNC: 89 MG/DL (ref 0–149)
TSH SERPL-ACNC: 0.68 MIU/L (ref 0.44–3.98)
VLDL: 18 MG/DL (ref 0–40)
WBC # BLD AUTO: 12.5 X10*3/UL (ref 4.4–11.3)

## 2024-05-15 PROCEDURE — 80053 COMPREHEN METABOLIC PANEL: CPT

## 2024-05-15 PROCEDURE — 80061 LIPID PANEL: CPT

## 2024-05-15 PROCEDURE — 84443 ASSAY THYROID STIM HORMONE: CPT

## 2024-05-15 PROCEDURE — 36415 COLL VENOUS BLD VENIPUNCTURE: CPT

## 2024-05-15 PROCEDURE — 85025 COMPLETE CBC W/AUTO DIFF WBC: CPT

## 2024-05-30 DIAGNOSIS — M25.512 PAIN IN LEFT SHOULDER: ICD-10-CM

## 2024-05-30 RX ORDER — MELOXICAM 15 MG/1
15 TABLET ORAL DAILY
Qty: 90 TABLET | Refills: 4 | Status: SHIPPED | OUTPATIENT
Start: 2024-05-30

## 2024-06-12 ENCOUNTER — APPOINTMENT (OUTPATIENT)
Dept: ORTHOPEDIC SURGERY | Facility: CLINIC | Age: 49
End: 2024-06-12
Payer: COMMERCIAL

## 2024-06-26 ENCOUNTER — OFFICE VISIT (OUTPATIENT)
Dept: ORTHOPEDIC SURGERY | Facility: CLINIC | Age: 49
End: 2024-06-26
Payer: COMMERCIAL

## 2024-06-26 VITALS — WEIGHT: 143 LBS | BODY MASS INDEX: 24.41 KG/M2 | HEIGHT: 64 IN

## 2024-06-26 DIAGNOSIS — M54.16 LUMBAR RADICULOPATHY: Primary | ICD-10-CM

## 2024-06-26 PROCEDURE — 99213 OFFICE O/P EST LOW 20 MIN: CPT | Performed by: ORTHOPAEDIC SURGERY

## 2024-06-26 PROCEDURE — 3008F BODY MASS INDEX DOCD: CPT | Performed by: ORTHOPAEDIC SURGERY

## 2024-06-26 ASSESSMENT — PAIN - FUNCTIONAL ASSESSMENT: PAIN_FUNCTIONAL_ASSESSMENT: 0-10

## 2024-06-26 NOTE — PROGRESS NOTES
Patient is 18 months status post L4-5 MIS TLIF.  She did very well following that operation.  I spoke with her in January of this year regarding some increase lower back pain, we got some x-rays at the time but she was able to fight through all that.    Over the last few months she has had increasing pain primarily in her right lower back, it is difficult for her to stand up from a seated position.  Additionally as the day goes on she starts getting some radiating pain into her left buttock and posterior thigh.  She definitely feels that this is a nerve pain.    She continues to be very active individual, she does a lot of exercise on her own including core strengthening.    She does not have any focal deficits on exam.  Able to heel/toe walk without difficulty.    We discussed her x-rays which demonstrate well-healed fusion at L4-5, there is new spondylolisthesis grade 1 at L5-S1.  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    I did recommend a trial of epidural injections again.  I reached out to Dr. Barron who performed the injections in the past, hopefully he can get her in for another trial.    If she does not have relief of her symptoms following the injection we will move forward with a new MRI of her lumbar spine.    She can follow-up with me as needed.    *This note was dictated using speech recognition software and was not corrected for spelling or grammatical errors*

## 2024-09-06 DIAGNOSIS — N30.00 ACUTE CYSTITIS WITHOUT HEMATURIA: ICD-10-CM

## 2024-09-06 DIAGNOSIS — N30.00 ACUTE CYSTITIS WITHOUT HEMATURIA: Primary | ICD-10-CM

## 2024-09-06 RX ORDER — NITROFURANTOIN 25; 75 MG/1; MG/1
100 CAPSULE ORAL 2 TIMES DAILY
Qty: 14 CAPSULE | Refills: 0 | Status: SHIPPED | OUTPATIENT
Start: 2024-09-06

## 2024-09-06 RX ORDER — NITROFURANTOIN MONOHYDRATE/MACROCRYSTALLINE 25; 75 MG/1; MG/1
100 CAPSULE ORAL 2 TIMES DAILY
Qty: 14 CAPSULE | Refills: 0 | Status: SHIPPED | OUTPATIENT
Start: 2024-09-06 | End: 2024-09-06